# Patient Record
Sex: FEMALE | Race: WHITE | NOT HISPANIC OR LATINO | Employment: OTHER | URBAN - METROPOLITAN AREA
[De-identification: names, ages, dates, MRNs, and addresses within clinical notes are randomized per-mention and may not be internally consistent; named-entity substitution may affect disease eponyms.]

---

## 2021-02-11 ENCOUNTER — RECORDS - HEALTHEAST (OUTPATIENT)
Dept: LAB | Facility: CLINIC | Age: 66
End: 2021-02-11

## 2021-02-11 LAB
SARS-COV-2 PCR COMMENT: NORMAL
SARS-COV-2 RNA SPEC QL NAA+PROBE: NEGATIVE
SARS-COV-2 VIRUS SPECIMEN SOURCE: NORMAL

## 2021-03-11 ENCOUNTER — RECORDS - HEALTHEAST (OUTPATIENT)
Dept: LAB | Facility: CLINIC | Age: 66
End: 2021-03-11

## 2021-03-29 ENCOUNTER — AMBULATORY - HEALTHEAST (OUTPATIENT)
Dept: OTHER | Facility: CLINIC | Age: 66
End: 2021-03-29

## 2021-03-29 ENCOUNTER — DOCUMENTATION ONLY (OUTPATIENT)
Dept: OTHER | Facility: CLINIC | Age: 66
End: 2021-03-29

## 2021-03-31 ENCOUNTER — ASSISTED LIVING VISIT (OUTPATIENT)
Dept: GERIATRICS | Facility: CLINIC | Age: 66
End: 2021-03-31
Payer: MEDICARE

## 2021-03-31 VITALS — BODY MASS INDEX: 23.46 KG/M2 | TEMPERATURE: 97.1 F | HEIGHT: 66 IN | WEIGHT: 146 LBS | OXYGEN SATURATION: 97 %

## 2021-03-31 DIAGNOSIS — G30.0 DEMENTIA OF THE ALZHEIMER'S TYPE WITH EARLY ONSET WITHOUT BEHAVIORAL DISTURBANCE (H): ICD-10-CM

## 2021-03-31 DIAGNOSIS — N81.4 UTERINE PROLAPSE: ICD-10-CM

## 2021-03-31 DIAGNOSIS — F10.11 HISTORY OF ETOH ABUSE: ICD-10-CM

## 2021-03-31 DIAGNOSIS — Z78.9 FULL CODE STATUS: ICD-10-CM

## 2021-03-31 DIAGNOSIS — Z78.9 TAKES DIETARY SUPPLEMENTS: Primary | ICD-10-CM

## 2021-03-31 DIAGNOSIS — F02.80 DEMENTIA OF THE ALZHEIMER'S TYPE WITH EARLY ONSET WITHOUT BEHAVIORAL DISTURBANCE (H): ICD-10-CM

## 2021-03-31 DIAGNOSIS — N32.81 OVERACTIVE BLADDER: ICD-10-CM

## 2021-03-31 RX ORDER — MULTIPLE VITAMINS W/ MINERALS TAB 9MG-400MCG
1 TAB ORAL DAILY
COMMUNITY
End: 2021-05-08

## 2021-03-31 RX ORDER — CHOLECALCIFEROL (VITAMIN D3) 50 MCG
1 TABLET ORAL DAILY
COMMUNITY
End: 2022-01-27

## 2021-03-31 RX ORDER — OXYBUTYNIN CHLORIDE 5 MG/1
5 TABLET ORAL 2 TIMES DAILY
COMMUNITY
End: 2021-11-24

## 2021-03-31 ASSESSMENT — MIFFLIN-ST. JEOR: SCORE: 1216.06
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
## 2021-04-01 RX ORDER — MULTIPLE VITAMINS W/ MINERALS TAB 9MG-400MCG
1 TAB ORAL DAILY
Qty: 30 TABLET | Refills: 11 | Status: SHIPPED | OUTPATIENT
Start: 2021-04-01 | End: 2021-11-24
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
## 2021-04-08 ENCOUNTER — RECORDS - HEALTHEAST (OUTPATIENT)
Dept: LAB | Facility: CLINIC | Age: 66
End: 2021-04-08

## 2021-04-28 ENCOUNTER — ASSISTED LIVING VISIT (OUTPATIENT)
Dept: GERIATRICS | Facility: CLINIC | Age: 66
End: 2021-04-28
Payer: MEDICARE

## 2021-04-28 VITALS
SYSTOLIC BLOOD PRESSURE: 105 MMHG | DIASTOLIC BLOOD PRESSURE: 80 MMHG | RESPIRATION RATE: 18 BRPM | OXYGEN SATURATION: 97 % | HEIGHT: 56 IN | WEIGHT: 151.2 LBS | HEART RATE: 69 BPM | TEMPERATURE: 97.2 F | BODY MASS INDEX: 34.01 KG/M2

## 2021-04-28 DIAGNOSIS — G30.0 DEMENTIA OF THE ALZHEIMER'S TYPE WITH EARLY ONSET WITHOUT BEHAVIORAL DISTURBANCE (H): ICD-10-CM

## 2021-04-28 DIAGNOSIS — F02.80 DEMENTIA OF THE ALZHEIMER'S TYPE WITH EARLY ONSET WITHOUT BEHAVIORAL DISTURBANCE (H): ICD-10-CM

## 2021-04-28 DIAGNOSIS — Z78.9 TAKES DIETARY SUPPLEMENTS: ICD-10-CM

## 2021-04-28 DIAGNOSIS — N32.81 OVERACTIVE BLADDER: Primary | ICD-10-CM

## 2021-04-28 DIAGNOSIS — I67.9 CEREBRAL VASCULAR DISEASE: ICD-10-CM

## 2021-04-28 ASSESSMENT — MIFFLIN-ST. JEOR: SCORE: 1080.9
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
## 2021-04-30 DIAGNOSIS — Z78.9 TAKES DIETARY SUPPLEMENTS: Primary | ICD-10-CM

## 2021-05-01 RX ORDER — FOLIC ACID/MV,IRON,MIN/LUTEIN 0.4-18-25
TABLET ORAL
Qty: 28 TABLET | Status: SHIPPED | OUTPATIENT
Start: 2021-05-01 | End: 2022-05-04
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
## 2021-05-21 ENCOUNTER — RECORDS - HEALTHEAST (OUTPATIENT)
Dept: LAB | Facility: CLINIC | Age: 66
End: 2021-05-21

## 2021-09-07 PROCEDURE — U0005 INFEC AGEN DETEC AMPLI PROBE: HCPCS | Mod: ORL | Performed by: INTERNAL MEDICINE

## 2021-09-08 ENCOUNTER — LAB REQUISITION (OUTPATIENT)
Dept: LAB | Facility: CLINIC | Age: 66
End: 2021-09-08
Payer: MEDICARE

## 2021-09-08 DIAGNOSIS — U07.1 COVID-19: ICD-10-CM

## 2021-09-09 LAB — SARS-COV-2 RNA RESP QL NAA+PROBE: NEGATIVE

## 2021-09-15 PROCEDURE — U0003 INFECTIOUS AGENT DETECTION BY NUCLEIC ACID (DNA OR RNA); SEVERE ACUTE RESPIRATORY SYNDROME CORONAVIRUS 2 (SARS-COV-2) (CORONAVIRUS DISEASE [COVID-19]), AMPLIFIED PROBE TECHNIQUE, MAKING USE OF HIGH THROUGHPUT TECHNOLOGIES AS DESCRIBED BY CMS-2020-01-R: HCPCS | Mod: ORL | Performed by: INTERNAL MEDICINE

## 2021-09-16 ENCOUNTER — LAB REQUISITION (OUTPATIENT)
Dept: LAB | Facility: CLINIC | Age: 66
End: 2021-09-16
Payer: MEDICARE

## 2021-09-16 DIAGNOSIS — U07.1 COVID-19: ICD-10-CM

## 2021-09-17 LAB — SARS-COV-2 RNA RESP QL NAA+PROBE: NOT DETECTED

## 2021-10-19 ENCOUNTER — ASSISTED LIVING VISIT (OUTPATIENT)
Dept: GERIATRICS | Facility: CLINIC | Age: 66
End: 2021-10-19
Payer: MEDICARE

## 2021-10-19 VITALS
HEART RATE: 74 BPM | BODY MASS INDEX: 23.63 KG/M2 | WEIGHT: 147 LBS | SYSTOLIC BLOOD PRESSURE: 94 MMHG | RESPIRATION RATE: 23 BRPM | DIASTOLIC BLOOD PRESSURE: 71 MMHG | OXYGEN SATURATION: 98 % | HEIGHT: 66 IN | TEMPERATURE: 97.8 F

## 2021-10-19 DIAGNOSIS — Z00.00 ANNUAL PHYSICAL EXAM: Primary | ICD-10-CM

## 2021-10-19 DIAGNOSIS — G30.0 DEMENTIA OF THE ALZHEIMER'S TYPE WITH EARLY ONSET WITHOUT BEHAVIORAL DISTURBANCE (H): ICD-10-CM

## 2021-10-19 DIAGNOSIS — N81.4 UTERINE PROLAPSE: ICD-10-CM

## 2021-10-19 DIAGNOSIS — N32.81 OVERACTIVE BLADDER: ICD-10-CM

## 2021-10-19 DIAGNOSIS — F02.80 DEMENTIA OF THE ALZHEIMER'S TYPE WITH EARLY ONSET WITHOUT BEHAVIORAL DISTURBANCE (H): ICD-10-CM

## 2021-10-19 DIAGNOSIS — Z13.6 SCREENING FOR HYPERTENSION: ICD-10-CM

## 2021-10-19 DIAGNOSIS — F10.11 HISTORY OF ETOH ABUSE: ICD-10-CM

## 2021-10-19 PROBLEM — I67.9 CEREBRAL VASCULAR DISEASE: Status: ACTIVE | Noted: 2021-10-19

## 2021-10-19 PROBLEM — Z78.9 TAKES DIETARY SUPPLEMENTS: Status: ACTIVE | Noted: 2021-10-19

## 2021-10-19 PROBLEM — R41.3 MEMORY DEFICITS: Status: ACTIVE | Noted: 2020-09-18

## 2021-10-19 RX ORDER — DONEPEZIL HYDROCHLORIDE 5 MG/1
5 TABLET, FILM COATED ORAL AT BEDTIME
COMMUNITY
End: 2022-04-11

## 2021-10-19 RX ORDER — MEMANTINE HYDROCHLORIDE 5 MG/1
5 TABLET ORAL 2 TIMES DAILY
COMMUNITY
End: 2021-11-15

## 2021-10-19 RX ORDER — TRAZODONE HYDROCHLORIDE 50 MG/1
25 TABLET, FILM COATED ORAL 2 TIMES DAILY PRN
COMMUNITY
End: 2021-10-25

## 2021-10-19 ASSESSMENT — MIFFLIN-ST. JEOR: SCORE: 1220.6

## 2021-10-19 NOTE — PROGRESS NOTES
[facility]  Chief Complaint   Patient presents with     Annual Visit     [place] Medical Record Number:  [id number]  Place of Service where encounter took place:  [facility] ([facility]) [[id number]]    HPI:    Alberta Polanco  is a 66 year old  (1955), who is being seen today for an annual comprehensive visit. HPI information obtained from: facility chart records, facility staff, patient report and [facility] chart review.     Citlali was visited today while in the community area. She is talkative, stating that it is too cold outside to go on the porch but she is aware that it will be nicer outside later so she will go out at that time. She enjoys walking around outside and if not there then around the unit. She gets along well with the other residents on the unit. Her  lives on the first floor here at [facility] so she enjoys seeing him, reports that they went for a drive down to [place] which she enjoyed. She also shares that she went on a community outing yesterday, rode the bus down to the [facility] area. She denies pain or discomfort today and is sleeping well.     Staff report that she has had increasing anxiety as of late. memantine was started and trazadone PRN has been used as well.     ALLERGIES: Patient has no known allergies.  PAST MEDICAL HISTORY:  has a past medical history of Cerebral vascular disease (10/19/2021), Early onset Alzheimer's dementia without behavioral disturbance (H) (10/19/2021), History of ETOH abuse (10/19/2021), Memory deficits (9/18/2020), Overactive bladder (10/19/2021), and Uterine prolapse (10/19/2021).  PAST SURGICAL HISTORY:  has no past surgical history on file.  IMMUNIZATIONS:  Immunization History   Administered Date(s) Administered     COVID-19,PF,Pfizer 04/15/2021, 05/06/2021     Td (Adult), Adsorbed 04/19/2005     Above immunizations pulled from [facility]. MIIC and facility records also reconciled. Outstanding information sent to  " to update [facility].  Future immunizations needed:  yearly influenza per facility protocol    Current Outpatient Medications   Medication Sig Dispense Refill     CERTAVITE/ANTIOXIDANTS tablet TAKE 1 TABLET BY MOUTH ONCE DAILY 28 tablet PRN     donepezil (ARICEPT) 5 MG tablet Take 5 mg by mouth At Bedtime       memantine (NAMENDA) 5 MG tablet Take 5 mg by mouth 2 times daily       oxybutynin (DITROPAN) 5 MG tablet Take 5 mg by mouth 2 times daily       traZODone (DESYREL) 50 MG tablet Take 0.5 tablets (25 mg) by mouth 2 times daily as needed for other (anxiety)       vitamin D3 (CHOLECALCIFEROL) 50 mcg (2000 units) tablet Take 1 tablet by mouth daily       multivitamin w/minerals (THERA-VIT-M) tablet Take 1 tablet by mouth daily 30 tablet 11       Case Management:  I have reviewed the Assisted Living care plan, current immunizations and preventive care/cancer screening. .Patient's desire to return to the community is not assessible due to cognitive impairment. Current Level of Care is appropriate.    Advance Directive Discussion:    I reviewed the current advanced directives as reflected in EPIC, the POLST and the facility chart, and verified the congruency of orders.  I did not due to cognitive impairment review the advance directives with the resident.     Team Discussion:  I communicated with the appropriate disciplines involved with the Plan of Care:   Nursing      Information reviewed:  Medications, vital signs, orders, and nursing notes.    ROS:  Limited secondary to cognitive impairment but today pt reports as noted above.     Vitals:  BP 94/71   Pulse 74   Temp 97.8  F (36.6  C)   Resp 23   Ht 1.664 m (5' 5.5\")   Wt 66.7 kg (147 lb)   SpO2 98%   BMI 24.09 kg/m   Body mass index is 24.09 kg/m .  Exam:  GENERAL APPEARANCE:  Alert, in no distress, pleasant, cooperative  EYES: no discharge or mattering on lids or lashes noted  ENT:  moist mucous membranes, hearing acuity intact  NECK: " supple, symmetrical  RESP: no respiratory distress, Lung sounds clear, patient is on room air  CV:  rate and rhythm regular, no murmur. Edema none in bilateral lower extremities. VASCULAR: warm extremities without open areas.  ABDOMEN: normal bowel sounds, soft, nontender.  M/S:   Gait and station: ambulated without the use of an assistive device, no tenderness or swelling of the joints; able to move all extremities   SKIN:  Inspection and palpation of skin and subcutaneous tissue: skin warm, dry and intact without rashes  NEURO: no facial asymmetry, no speech deficits and able to follow directions, moves all extremities symmetrically, has pill rolling in fingers.   PSYCH:  insight, judgement, and memory impaired affect and mood normal    Lab/Diagnostic data:   Reviewed in [facility]    ASSESSMENT/PLAN  Dementia  Anxiety  Middle stage per recent neuropsych testing. She has had increased anxiety so memantine was recently starting to help her settle, this was also recommended by her neurologist as it may provide some benefit. Could consider citalopram in the future if continues to be anxious after the memantine is increased.   --continue with memantine 5 mg BID  --donepenzil 5 mg at HS  --follow up with neurology for another neuropsych evaluation in 1-2 years.   --will get BMP/CBC/TSH on next lab day due to increase anxiety    Overactive bladder  Uterine prolapse  Does not appear to have urinary incontinence any longer. Uterine prolapse is documented as chronic but was lost to follow up with gynecology.   --continue oxybutynin 5 mg BID  --follow up with gynecology PRN     Hx of ETOH use  Has been sober since January 2020.     Screening for hypertension  Based on JNC-8 goals,  patients age of 66 year old, no presence of diabetes or CKD, and goals of care goal BP is <150/90 mm Hg. Patient is stable and continue without pharmacological invention with routine assessment..    Electronically signed by:  Marysol VENCES  MICHELLE Carroll CNP

## 2021-10-19 NOTE — LETTER
10/19/2021        RE: Alberta Polanco  [facility]  [address] [address] [address]        [place] [facility]  Chief Complaint   Patient presents with     Annual Visit     [place] Medical Record Number:  [id number]  Place of Service where encounter took place:  [facility] ([facility]) [[id number]]    HPI:    Alberta Polanco  is a 66 year old  (1955), who is being seen today for an annual comprehensive visit. HPI information obtained from: {FGS HPI:964571}.  Today's concerns are:  {FGS DX:985140}  {HTN}    ALLERGIES: Patient has no known allergies.  PAST MEDICAL HISTORY:  has no past medical history on file.  PAST SURGICAL HISTORY:  has no past surgical history on file.  IMMUNIZATIONS:  Immunization History   Administered Date(s) Administered     COVID-19,PF,Pfizer 04/15/2021, 05/06/2021     Above immunizations pulled from [facility]. MIIC and facility records also reconciled. Outstanding information sent to  to update [facility] ***.  Future immunizations {fgs future immunization:435854}  ***  Current Outpatient Medications   Medication Sig Dispense Refill     CERTAVITE/ANTIOXIDANTS tablet TAKE 1 TABLET BY MOUTH ONCE DAILY 28 tablet PRN     donepezil (ARICEPT) 5 MG tablet Take 5 mg by mouth At Bedtime       memantine (NAMENDA) 5 MG tablet Take 5 mg by mouth 2 times daily       oxybutynin (DITROPAN) 5 MG tablet Take 5 mg by mouth 2 times daily       traZODone (DESYREL) 50 MG tablet Take 25 mg by mouth 2 times daily as needed for sleep       vitamin D3 (CHOLECALCIFEROL) 50 mcg (2000 units) tablet Take 1 tablet by mouth daily       multivitamin w/minerals (THERA-VIT-M) tablet Take 1 tablet by mouth daily 30 tablet 11     {Providers Please double check the med list (in the plan section >> meds & orders tab) and Discontinue any of the meds flagged by the TC to be discontinued}  ***  Case Management:  I have reviewed the {fgsannualcareplan1:132722} .{fgs  "cancer screenin} Patient's desire to return to the community is {FGS RETURN TO COMMUNITY:389812}. Current Level of Care is appropriate.    Advance Directive Discussion:    I reviewed the current advanced directives as reflected in EPIC, the POLST and the facility chart, and verified the congruency of orders ***. I contacted the first party *** and {ADVANCED DIRECTIVE DISCUSSION:785892} plan of Care.  I {DID/DID NOT:416786} review the advance directives with the resident.     Team Discussion:  I communicated with the appropriate disciplines involved with the Plan of Care:   {NURSING HOME DISCIPLINES:088585}  Patient's goal is {fgsgoal:523036}.  Information reviewed:  Medications, vital signs, orders, and nursing notes.    ROS:  {qwrvfd68:159842}    Vitals:  BP 94/71   Pulse 74   Temp 97.8  F (36.6  C)   Resp 23   Ht 1.664 m (5' 5.5\")   Wt 66.7 kg (147 lb)   SpO2 98%   BMI 24.09 kg/m   Body mass index is 24.09 kg/m .  Exam:  {Nursing home physical exam :866253} {2 in each of 9 for comp exam}    Lab/Diagnostic data:   {fgslab:946103}    ASSESSMENT/PLAN  {FGS DX:302208}  {HTN}    {fgsorders:972233}  ***    Electronically signed by:  Lian Dubose ***  {Providers Please double check the med list (in the plan section >> meds & orders tab) and Discontinue any of the meds flagged by the TC to be discontinued}            Sincerely,        Marysol Carroll, MICHELLE CNP    "

## 2021-10-19 NOTE — LETTER
10/19/2021        RE: Alberta Polanco  [facility]  [address] [address]        [place] [facility]  Chief Complaint   Patient presents with     Annual Visit     [facility] Medical Record Number:  [id number]  Place of Service where encounter took place:  [facility] ([facility]) [[id number]]    HPI:    Alberta Polanco  is a 66 year old  (1955), who is being seen today for an annual comprehensive visit. HPI information obtained from: facility chart records, facility staff, patient report and [facility] chart review.     Citlali was visited today while in the community area. She is talkative, stating that it is too cold outside to go on the porch but she is aware that it will be nicer outside later so she will go out at that time. She enjoys walking around outside and if not there then around the unit. She gets along well with the other residents on the unit. Her  lives on the first floor here at [facility] so she enjoys seeing him, reports that they went for a drive down to [place] which she enjoyed. She also shares that she went on a community outing yesterday, rode the bus down to the [facility] area. She denies pain or discomfort today and is sleeping well.     Staff report that she has had increasing anxiety as of late. memantine was started and trazadone PRN has been used as well.     ALLERGIES: Patient has no known allergies.  PAST MEDICAL HISTORY:  has a past medical history of Cerebral vascular disease (10/19/2021), Early onset Alzheimer's dementia without behavioral disturbance (H) (10/19/2021), History of ETOH abuse (10/19/2021), Memory deficits (9/18/2020), Overactive bladder (10/19/2021), and Uterine prolapse (10/19/2021).  PAST SURGICAL HISTORY:  has no past surgical history on file.  IMMUNIZATIONS:  Immunization History   Administered Date(s) Administered     COVID-19,PF,Pfizer 04/15/2021, 05/06/2021     Td (Adult), Adsorbed 04/19/2005     Above  "immunizations pulled from [facility]. MIIC and facility records also reconciled. Outstanding information sent to  to update [facility].  Future immunizations needed:  yearly influenza per facility protocol    Current Outpatient Medications   Medication Sig Dispense Refill     CERTAVITE/ANTIOXIDANTS tablet TAKE 1 TABLET BY MOUTH ONCE DAILY 28 tablet PRN     donepezil (ARICEPT) 5 MG tablet Take 5 mg by mouth At Bedtime       memantine (NAMENDA) 5 MG tablet Take 5 mg by mouth 2 times daily       oxybutynin (DITROPAN) 5 MG tablet Take 5 mg by mouth 2 times daily       traZODone (DESYREL) 50 MG tablet Take 0.5 tablets (25 mg) by mouth 2 times daily as needed for other (anxiety)       vitamin D3 (CHOLECALCIFEROL) 50 mcg (2000 units) tablet Take 1 tablet by mouth daily       multivitamin w/minerals (THERA-VIT-M) tablet Take 1 tablet by mouth daily 30 tablet 11       Case Management:  I have reviewed the Assisted Living care plan, current immunizations and preventive care/cancer screening. .Patient's desire to return to the community is not assessible due to cognitive impairment. Current Level of Care is appropriate.    Advance Directive Discussion:    I reviewed the current advanced directives as reflected in EPIC, the POLST and the facility chart, and verified the congruency of orders.  I did not due to cognitive impairment review the advance directives with the resident.     Team Discussion:  I communicated with the appropriate disciplines involved with the Plan of Care:   Nursing      Information reviewed:  Medications, vital signs, orders, and nursing notes.    ROS:  Limited secondary to cognitive impairment but today pt reports as noted above.     Vitals:  BP 94/71   Pulse 74   Temp 97.8  F (36.6  C)   Resp 23   Ht 1.664 m (5' 5.5\")   Wt 66.7 kg (147 lb)   SpO2 98%   BMI 24.09 kg/m   Body mass index is 24.09 kg/m .  Exam:  GENERAL APPEARANCE:  Alert, in no distress, pleasant, " cooperative  EYES: no discharge or mattering on lids or lashes noted  ENT:  moist mucous membranes, hearing acuity intact  NECK: supple, symmetrical  RESP: no respiratory distress, Lung sounds clear, patient is on room air  CV:  rate and rhythm regular, no murmur. Edema none in bilateral lower extremities. VASCULAR: warm extremities without open areas.  ABDOMEN: normal bowel sounds, soft, nontender.  M/S:   Gait and station: ambulated without the use of an assistive device, no tenderness or swelling of the joints; able to move all extremities   SKIN:  Inspection and palpation of skin and subcutaneous tissue: skin warm, dry and intact without rashes  NEURO: no facial asymmetry, no speech deficits and able to follow directions, moves all extremities symmetrically, has pill rolling in fingers.   PSYCH:  insight, judgement, and memory impaired affect and mood normal    Lab/Diagnostic data:   Reviewed in [facility]    ASSESSMENT/PLAN  Dementia  Anxiety  Middle stage per recent neuropsych testing. She has had increased anxiety so memantine was recently starting to help her settle, this was also recommended by her neurologist as it may provide some benefit. Could consider citalopram in the future if continues to be anxious after the memantine is increased.   --continue with memantine 5 mg BID  --donepenzil 5 mg at HS  --follow up with neurology for another neuropsych evaluation in 1-2 years.   --will get BMP/CBC/TSH on next lab day due to increase anxiety    Overactive bladder  Uterine prolapse  Does not appear to have urinary incontinence any longer. Uterine prolapse is documented as chronic but was lost to follow up with gynecology.   --continue oxybutynin 5 mg BID  --follow up with gynecology PRN     Hx of ETOH use  Has been sober since January 2020.     Screening for hypertension  Based on JNC-8 goals,  patients age of 66 year old, no presence of diabetes or CKD, and goals of care goal BP is <150/90 mm Hg. Patient  is stable and continue without pharmacological invention with routine assessment..    Electronically signed by:  MICHELLE Carbone CNP               Sincerely,        MICHELLE Carbone CNP

## 2021-10-25 RX ORDER — TRAZODONE HYDROCHLORIDE 50 MG/1
25 TABLET, FILM COATED ORAL 2 TIMES DAILY PRN
COMMUNITY
Start: 2021-10-25 | End: 2021-12-28

## 2021-10-26 ENCOUNTER — LAB REQUISITION (OUTPATIENT)
Dept: LAB | Facility: CLINIC | Age: 66
End: 2021-10-26
Payer: MEDICARE

## 2021-10-26 DIAGNOSIS — F41.9 ANXIETY DISORDER, UNSPECIFIED: ICD-10-CM

## 2021-10-27 ENCOUNTER — TRANSFERRED RECORDS (OUTPATIENT)
Dept: HEALTH INFORMATION MANAGEMENT | Facility: CLINIC | Age: 66
End: 2021-10-27

## 2021-10-27 PROCEDURE — 84443 ASSAY THYROID STIM HORMONE: CPT | Mod: ORL | Performed by: NURSE PRACTITIONER

## 2021-10-27 PROCEDURE — 85027 COMPLETE CBC AUTOMATED: CPT | Mod: ORL | Performed by: NURSE PRACTITIONER

## 2021-10-27 PROCEDURE — 80048 BASIC METABOLIC PNL TOTAL CA: CPT | Mod: ORL | Performed by: NURSE PRACTITIONER

## 2021-10-27 PROCEDURE — 36415 COLL VENOUS BLD VENIPUNCTURE: CPT | Mod: ORL | Performed by: NURSE PRACTITIONER

## 2021-10-27 PROCEDURE — P9603 ONE-WAY ALLOW PRORATED MILES: HCPCS | Mod: ORL | Performed by: NURSE PRACTITIONER

## 2021-10-28 LAB
ANION GAP SERPL CALCULATED.3IONS-SCNC: 7 MMOL/L (ref 5–18)
BUN SERPL-MCNC: 13 MG/DL (ref 8–22)
CALCIUM SERPL-MCNC: 10 MG/DL (ref 8.5–10.5)
CHLORIDE BLD-SCNC: 104 MMOL/L (ref 98–107)
CO2 SERPL-SCNC: 28 MMOL/L (ref 22–31)
CREAT SERPL-MCNC: 0.83 MG/DL (ref 0.6–1.1)
ERYTHROCYTE [DISTWIDTH] IN BLOOD BY AUTOMATED COUNT: 12.8 % (ref 10–15)
GFR SERPL CREATININE-BSD FRML MDRD: 74 ML/MIN/1.73M2
GLUCOSE BLD-MCNC: 90 MG/DL (ref 70–125)
HCT VFR BLD AUTO: 42.5 % (ref 35–47)
HGB BLD-MCNC: 14 G/DL (ref 11.7–15.7)
MCH RBC QN AUTO: 31.7 PG (ref 26.5–33)
MCHC RBC AUTO-ENTMCNC: 32.9 G/DL (ref 31.5–36.5)
MCV RBC AUTO: 96 FL (ref 78–100)
PLATELET # BLD AUTO: 305 10E3/UL (ref 150–450)
POTASSIUM BLD-SCNC: 4.6 MMOL/L (ref 3.5–5)
RBC # BLD AUTO: 4.41 10E6/UL (ref 3.8–5.2)
SODIUM SERPL-SCNC: 139 MMOL/L (ref 136–145)
TSH SERPL DL<=0.005 MIU/L-ACNC: 3.04 UIU/ML (ref 0.3–5)
WBC # BLD AUTO: 5.5 10E3/UL (ref 4–11)

## 2021-11-13 DIAGNOSIS — G30.0 EARLY ONSET ALZHEIMER'S DEMENTIA WITHOUT BEHAVIORAL DISTURBANCE (H): Primary | ICD-10-CM

## 2021-11-13 DIAGNOSIS — F02.80 EARLY ONSET ALZHEIMER'S DEMENTIA WITHOUT BEHAVIORAL DISTURBANCE (H): Primary | ICD-10-CM

## 2021-11-15 RX ORDER — MEMANTINE HYDROCHLORIDE 5 MG/1
TABLET ORAL
Qty: 56 TABLET | Status: SHIPPED | OUTPATIENT
Start: 2021-11-15 | End: 2021-11-24 | Stop reason: DRUGHIGH

## 2021-11-23 VITALS
DIASTOLIC BLOOD PRESSURE: 71 MMHG | WEIGHT: 147 LBS | SYSTOLIC BLOOD PRESSURE: 94 MMHG | TEMPERATURE: 97.8 F | HEART RATE: 74 BPM | BODY MASS INDEX: 24.09 KG/M2 | RESPIRATION RATE: 23 BRPM

## 2021-11-24 ENCOUNTER — ASSISTED LIVING VISIT (OUTPATIENT)
Dept: GERIATRICS | Facility: CLINIC | Age: 66
End: 2021-11-24
Payer: MEDICARE

## 2021-11-24 DIAGNOSIS — N81.4 UTERINE PROLAPSE: ICD-10-CM

## 2021-11-24 DIAGNOSIS — F41.9 ANXIETY: ICD-10-CM

## 2021-11-24 DIAGNOSIS — G30.0 DEMENTIA OF THE ALZHEIMER'S TYPE WITH EARLY ONSET WITHOUT BEHAVIORAL DISTURBANCE (H): Primary | ICD-10-CM

## 2021-11-24 DIAGNOSIS — F02.80 DEMENTIA OF THE ALZHEIMER'S TYPE WITH EARLY ONSET WITHOUT BEHAVIORAL DISTURBANCE (H): Primary | ICD-10-CM

## 2021-11-24 DIAGNOSIS — N32.81 OVERACTIVE BLADDER: ICD-10-CM

## 2021-11-24 DIAGNOSIS — I67.9 CEREBRAL VASCULAR DISEASE: ICD-10-CM

## 2021-11-24 PROCEDURE — 99207 PR CDG-MDM COMPONENT: MEETS MODERATE - DOWN CODED: CPT | Performed by: NURSE PRACTITIONER

## 2021-11-24 RX ORDER — MEMANTINE HYDROCHLORIDE 10 MG/1
10 TABLET ORAL 2 TIMES DAILY
COMMUNITY
End: 2022-10-13

## 2021-11-24 RX ORDER — OXYBUTYNIN CHLORIDE 5 MG/1
5 TABLET, EXTENDED RELEASE ORAL 2 TIMES DAILY
COMMUNITY
End: 2022-04-11

## 2021-11-24 NOTE — LETTER
11/24/2021        RE: Alberta Polanco  [facility]  [address] [facility] [address]        [place] [facility]  [place] Medical Record Number:  [id number]  Place of Service where encounter took place:  [facility] ([facility]) [[id number]]  Chief Complaint   Patient presents with     RECHECK       HPI:    Alberta Polanco  is a 66 year old (1955), who is being seen today for an episodic care visit.  HPI information obtained from: facility chart records, facility staff, patient report and [facility] chart review.   She has lived in Memory Care at this facility since 2/2021. Medical history significant for early onset Alzheimer's dementia, cerebral vascular disease, anxiety, urinary incontinence, etoh abuse.   [place]  [place] in [place]. They have 2 daughters, one lives in [place].     Today's concern is:     Dementia of the Alzheimer's type with early onset without behavioral disturbance (H)  Cerebral vascular disease  Anxiety  Overactive bladder  Uterine prolapse   She is a poor historian. Repetitive and tangential. Focused on the weather and the closed patio. Denies feeling ill. Denies pain. Good appetite. Ambulates without a device. Requires supervision to min assist with cares. Nurses report she is less anxious and no behavior issues.       Past Medical and Surgical History reviewed in Epic today.    MEDICATIONS:    Current Outpatient Medications   Medication Sig Dispense Refill     aspirin (ASA) 81 MG EC tablet Take 1 tablet (81 mg) by mouth daily 30 tablet 11     CERTAVITE/ANTIOXIDANTS tablet TAKE 1 TABLET BY MOUTH ONCE DAILY 28 tablet PRN     donepezil (ARICEPT) 5 MG tablet Take 5 mg by mouth At Bedtime       memantine (NAMENDA) 10 MG tablet Take 10 mg by mouth 2 times daily       oxybutynin ER (DITROPAN-XL) 5 MG 24 hr tablet Take 5 mg by mouth 2 times daily       traZODone (DESYREL) 50 MG tablet Take 0.5 tablets (25 mg) by mouth 2 times daily as needed for other (anxiety)        vitamin D3 (CHOLECALCIFEROL) 50 mcg (2000 units) tablet Take 1 tablet by mouth daily           REVIEW OF SYSTEMS:  Unobtainable secondary to cognitive impairment.     Objective:  BP 94/71   Pulse 74   Temp 97.8  F (36.6  C)   Resp 23   Wt 66.7 kg (147 lb)   BMI 24.09 kg/m    Exam:  GENERAL APPEARANCE:  Alert, in no distress  ENT:  normal hearing acuity  EYES:  EOM normal, conjunctiva and lids normal  NECK: no adenopathy,masses or thyromegaly  RESP:  lungs clear to auscultation , no respiratory distress  CV:  regular rate and rhythm, no murmur, no edema  ABDOMEN:  soft, non-tender, no distension, no masses  M/S:   gait steady without a device. MCCONNELL with good strength. No joint inflammation  SKIN:  no visible rashes or open areas  PSYCH:  oriented to self, insight and judgement impaired, memory impaired , affect and mood normal    Labs:   Recent labs in [place] reviewed by me today.     ASSESSMENT / PLAN:  (G30.0,  F02.80) Dementia of the Alzheimer's type with early onset without behavioral disturbance (H)  (primary encounter diagnosis)  Comment: 5-6 yr history of declining cognition and erratic behavior (driving without license, inability to maintain a job, inability to manage finances, etoh abuse).  She had Neuropsych testing 3/26/2021 that showed moderate to severe neurocognitive deficits in all domains. Dementia felt to possibly be due to frontotemporal lobar degeneration or the frontal variant of Alzheimer's disease. Repeat testing in 1-2 yrs was recommended.   She appears to be content and doing well in Memory Care  Plan: continue donepezil, memantine, prn trazodone. Neurology follow up as scheduled. She has a legal guardian, Brigitte Barker.     (I67.9) Cerebral vascular disease  Comment: small old lacunar infarcts noted on MRI brain 9/2020  Plan: start ASA  81 MG EC tablet         (F41.9) Anxiety  Comment: improved per staff report   Plan: continue prn trazodone, donepezil and memantine    (N32.81) Overactive  bladder  (N81.4) Uterine prolapse  Comment: chronic, no s/s of infection   Plan: continue oxybutynin       Electronically signed by:  MICHELLE Marin CNP                 Sincerely,        MICHELLE Marin CNP

## 2021-11-24 NOTE — PROGRESS NOTES
[facility]  [place] Medical Record Number:  [id number]  Place of Service where encounter took place:  [facility] ([facility]) [[id number]]  Chief Complaint   Patient presents with     RECHECK       HPI:    Alberta Polanco  is a 66 year old (1955), who is being seen today for an episodic care visit.  HPI information obtained from: facility chart records, facility staff, patient report and [facility] chart review.   She has lived in Memory Care at this facility since 2/2021. Medical history significant for early onset Alzheimer's dementia, cerebral vascular disease, anxiety, urinary incontinence, etoh abuse.   [place] in [place]. They have 2 daughters, one lives in [place].     Today's concern is:     Dementia of the Alzheimer's type with early onset without behavioral disturbance (H)  Cerebral vascular disease  Anxiety  Overactive bladder  Uterine prolapse   She is a poor historian. Repetitive and tangential. Focused on the weather and the closed patio. Denies feeling ill. Denies pain. Good appetite. Ambulates without a device. Requires supervision to min assist with cares. Nurses report she is less anxious and no behavior issues.       Past Medical and Surgical History reviewed in Epic today.    MEDICATIONS:    Current Outpatient Medications   Medication Sig Dispense Refill     aspirin (ASA) 81 MG EC tablet Take 1 tablet (81 mg) by mouth daily 30 tablet 11     CERTAVITE/ANTIOXIDANTS tablet TAKE 1 TABLET BY MOUTH ONCE DAILY 28 tablet PRN     donepezil (ARICEPT) 5 MG tablet Take 5 mg by mouth At Bedtime       memantine (NAMENDA) 10 MG tablet Take 10 mg by mouth 2 times daily       oxybutynin ER (DITROPAN-XL) 5 MG 24 hr tablet Take 5 mg by mouth 2 times daily       traZODone (DESYREL) 50 MG tablet Take 0.5 tablets (25 mg) by mouth 2 times daily as needed for other (anxiety)       vitamin D3 (CHOLECALCIFEROL) 50 mcg (2000 units) tablet Take 1 tablet by mouth daily           REVIEW OF  SYSTEMS:  Unobtainable secondary to cognitive impairment.     Objective:  BP 94/71   Pulse 74   Temp 97.8  F (36.6  C)   Resp 23   Wt 66.7 kg (147 lb)   BMI 24.09 kg/m    Exam:  GENERAL APPEARANCE:  Alert, in no distress  ENT:  normal hearing acuity  EYES:  EOM normal, conjunctiva and lids normal  NECK: no adenopathy,masses or thyromegaly  RESP:  lungs clear to auscultation , no respiratory distress  CV:  regular rate and rhythm, no murmur, no edema  ABDOMEN:  soft, non-tender, no distension, no masses  M/S:   gait steady without a device. MCCONNELL with good strength. No joint inflammation  SKIN:  no visible rashes or open areas  PSYCH:  oriented to self, insight and judgement impaired, memory impaired , affect and mood normal    Labs:   Recent labs in [place] [facility] reviewed by me today.     ASSESSMENT / PLAN:  (G30.0,  F02.80) Dementia of the Alzheimer's type with early onset without behavioral disturbance (H)  (primary encounter diagnosis)  Comment: 5-6 yr history of declining cognition and erratic behavior (driving without license, inability to maintain a job, inability to manage finances, etoh abuse).  She had Neuropsych testing 3/26/2021 that showed moderate to severe neurocognitive deficits in all domains. Dementia felt to possibly be due to frontotemporal lobar degeneration or the frontal variant of Alzheimer's disease. Repeat testing in 1-2 yrs was recommended.   She appears to be content and doing well in Memory Care  Plan: continue donepezil, memantine, prn trazodone. Neurology follow up as scheduled. She has a legal guardian, Brigitte Barker.     (I67.9) Cerebral vascular disease  Comment: small old lacunar infarcts noted on MRI brain 9/2020  Plan: start ASA  81 MG EC tablet         (F41.9) Anxiety  Comment: improved per staff report   Plan: continue prn trazodone, donepezil and memantine    (N32.81) Overactive bladder  (N81.4) Uterine prolapse  Comment: chronic, no s/s of infection   Plan: continue oxybutynin        Electronically signed by:  MICHELLE Marin CNP

## 2021-12-28 DIAGNOSIS — F41.9 ANXIETY: Primary | ICD-10-CM

## 2021-12-28 RX ORDER — TRAZODONE HYDROCHLORIDE 50 MG/1
25 TABLET, FILM COATED ORAL 2 TIMES DAILY PRN
Qty: 30 TABLET | Refills: 3 | Status: SHIPPED | OUTPATIENT
Start: 2021-12-28

## 2022-01-09 PROCEDURE — U0005 INFEC AGEN DETEC AMPLI PROBE: HCPCS | Mod: ORL | Performed by: INTERNAL MEDICINE

## 2022-01-10 ENCOUNTER — LAB REQUISITION (OUTPATIENT)
Dept: LAB | Facility: CLINIC | Age: 67
End: 2022-01-10
Payer: MEDICARE

## 2022-01-10 DIAGNOSIS — U07.1 COVID-19: ICD-10-CM

## 2022-01-11 LAB — SARS-COV-2 RNA RESP QL NAA+PROBE: POSITIVE

## 2022-01-12 ENCOUNTER — LAB REQUISITION (OUTPATIENT)
Dept: LAB | Facility: CLINIC | Age: 67
End: 2022-01-12
Payer: MEDICARE

## 2022-01-12 ENCOUNTER — ASSISTED LIVING VISIT (OUTPATIENT)
Dept: GERIATRICS | Facility: CLINIC | Age: 67
End: 2022-01-12
Payer: MEDICARE

## 2022-01-12 VITALS
BODY MASS INDEX: 25.89 KG/M2 | SYSTOLIC BLOOD PRESSURE: 118 MMHG | WEIGHT: 158 LBS | HEART RATE: 72 BPM | TEMPERATURE: 97.6 F | RESPIRATION RATE: 21 BRPM | DIASTOLIC BLOOD PRESSURE: 79 MMHG

## 2022-01-12 DIAGNOSIS — G30.0 DEMENTIA OF THE ALZHEIMER'S TYPE WITH EARLY ONSET WITHOUT BEHAVIORAL DISTURBANCE (H): ICD-10-CM

## 2022-01-12 DIAGNOSIS — U07.1 LAB TEST POSITIVE FOR DETECTION OF COVID-19 VIRUS: Primary | ICD-10-CM

## 2022-01-12 DIAGNOSIS — U07.1 COVID-19: ICD-10-CM

## 2022-01-12 DIAGNOSIS — F41.9 ANXIETY: ICD-10-CM

## 2022-01-12 DIAGNOSIS — F02.80 DEMENTIA OF THE ALZHEIMER'S TYPE WITH EARLY ONSET WITHOUT BEHAVIORAL DISTURBANCE (H): ICD-10-CM

## 2022-01-12 DIAGNOSIS — I67.9 CEREBRAL VASCULAR DISEASE: ICD-10-CM

## 2022-01-12 NOTE — PROGRESS NOTES
"[place] [facility]  [place] Medical Record Number:  [id number]  Place of Service where encounter took place:  [facility] ([facility]) [[id number]]  Chief Complaint   Patient presents with     RECHECK       HPI:    Alberta Polanco  is a 66 year old (1955), who is being seen today for an episodic care visit.  HPI information obtained from: facility chart records, facility staff, patient report and [facility] chart review.   She has lived in Memory Care at this facility since 2/2021. Medical history significant for early onset Alzheimer's dementia, cerebral vascular disease, anxiety, urinary incontinence, etoh abuse.   [place]  [place] in [place]. They have 2 daughters, one lives in [place]. She has a legal guardian through [employer].       Today's concern is:     Lab test positive for detection of COVID-19 virus  Dementia of the Alzheimer's type with early onset without behavioral disturbance (H)  Cerebral vascular disease  Anxiety   She tested positive for COVID on surveillance testing 1/9/2021. Afebrile and asymptomatic. The challenge has been keeping her in her room as she usually ambulates about the unit. She is a poor historian. Denies feeling ill. \"I'm not sick. I'm never sick.\" Conversation is repetitive. Requires supervision to min assist with cares.     Past Medical and Surgical History reviewed in Epic today.    MEDICATIONS:    Current Outpatient Medications   Medication Sig Dispense Refill     aspirin (ASA) 81 MG EC tablet Take 1 tablet (81 mg) by mouth daily 30 tablet 11     CERTAVITE/ANTIOXIDANTS tablet TAKE 1 TABLET BY MOUTH ONCE DAILY 28 tablet PRN     donepezil (ARICEPT) 5 MG tablet Take 5 mg by mouth At Bedtime       memantine (NAMENDA) 10 MG tablet Take 10 mg by mouth 2 times daily       oxybutynin ER (DITROPAN-XL) 5 MG 24 hr tablet Take 5 mg by mouth 2 times daily       traZODone (DESYREL) 50 MG tablet Take 0.5 tablets (25 mg) by mouth 2 times daily as needed for other " (anxiety) 30 tablet 3     vitamin D3 (CHOLECALCIFEROL) 50 mcg (2000 units) tablet Take 1 tablet by mouth daily           REVIEW OF SYSTEMS:  Unobtainable secondary to cognitive impairment.     Objective:  /79   Pulse 72   Temp 97.6  F (36.4  C)   Resp 21   Wt 71.7 kg (158 lb)   BMI 25.89 kg/m    Limited exam due to COVID precautions   GENERAL APPEARANCE:  Alert, in no distress  ENT:  normal hearing acuity  EYES:  EOM normal, conjunctiva and lids normal  RESP:  no respiratory distress  CV:  no edema  M/S:   gait steady. MCCONNELL with good strength  PSYCH:  oriented to self, insight and judgement impaired, memory impaired , anxious    Labs:   Recent labs in [place] reviewed by me today.     ASSESSMENT / PLAN:  (U07.1) Lab test positive for detection of COVID-19 virus  (primary encounter diagnosis)  Comment: appears asymptomatic   Plan: closely monitor VS, O2 sats, respiratory status.   Family and guardian have been notified per nurse.     (G30.0,  F02.80) Dementia of the Alzheimer's type with early onset without behavioral disturbance (H)  (I67.9) Cerebral vascular disease  (F41.9) Anxiety  Comment: somewhat anxious today re: quarantine, but no behavior issues.    She had a 5-6 yr history of declining cognition and erratic behavior (driving without license, inability to maintain a job, inability to manage finances, etoh abuse).  Neuropsych testing 3/26/2021 showed moderate to severe neurocognitive deficits in all domains. Dementia felt to possibly be due to frontotemporal lobar degeneration or the frontal variant of Alzheimer's disease. Repeat testing in 1-2 yrs was recommended.   Plan:  continue donepezil, memantine, prn trazodone. Neurology follow up as scheduled. Memory Care staff assistance with cares, meals, activities and med admin.       Electronically signed by:  MICHELLE Marin CNP

## 2022-01-12 NOTE — LETTER
"    1/12/2022        RE: Alberta Polanco  [facility]  [address] [facility] [address]        [place] [facility]  [place] [facility] Record Number:  [id number]  Place of Service where encounter took place:  [facility] ([facility]) [[id number]]  Chief Complaint   Patient presents with     RECHECK       HPI:    Alberta Polanco  is a 66 year old (1955), who is being seen today for an episodic care visit.  HPI information obtained from: facility chart records, facility staff, patient report and [facility] chart review.   She has lived in Memory Care at this facility since 2/2021. Medical history significant for early onset Alzheimer's dementia, cerebral vascular disease, anxiety, urinary incontinence, etoh abuse.   [place] in [place]. They have 2 daughters, one lives in [place]. She has a legal guardian through [employer].       Today's concern is:     Lab test positive for detection of COVID-19 virus  Dementia of the Alzheimer's type with early onset without behavioral disturbance (H)  Cerebral vascular disease  Anxiety   She tested positive for COVID on surveillance testing 1/9/2021. Afebrile and asymptomatic. The challenge has been keeping her in her room as she usually ambulates about the unit. She is a poor historian. Denies feeling ill. \"I'm not sick. I'm never sick.\" Conversation is repetitive. Requires supervision to min assist with cares.     Past Medical and Surgical History reviewed in Epic today.    MEDICATIONS:    Current Outpatient Medications   Medication Sig Dispense Refill     aspirin (ASA) 81 MG EC tablet Take 1 tablet (81 mg) by mouth daily 30 tablet 11     CERTAVITE/ANTIOXIDANTS tablet TAKE 1 TABLET BY MOUTH ONCE DAILY 28 tablet PRN     donepezil (ARICEPT) 5 MG tablet Take 5 mg by mouth At Bedtime       memantine (NAMENDA) 10 MG tablet Take 10 mg by mouth 2 times daily       oxybutynin ER (DITROPAN-XL) 5 MG 24 hr tablet Take 5 mg by mouth 2 times " daily       traZODone (DESYREL) 50 MG tablet Take 0.5 tablets (25 mg) by mouth 2 times daily as needed for other (anxiety) 30 tablet 3     vitamin D3 (CHOLECALCIFEROL) 50 mcg (2000 units) tablet Take 1 tablet by mouth daily           REVIEW OF SYSTEMS:  Unobtainable secondary to cognitive impairment.     Objective:  /79   Pulse 72   Temp 97.6  F (36.4  C)   Resp 21   Wt 71.7 kg (158 lb)   BMI 25.89 kg/m    Limited exam due to COVID precautions   GENERAL APPEARANCE:  Alert, in no distress  ENT:  normal hearing acuity  EYES:  EOM normal, conjunctiva and lids normal  RESP:  no respiratory distress  CV:  no edema  M/S:   gait steady. MCCONNELL with good strength  PSYCH:  oriented to self, insight and judgement impaired, memory impaired , anxious    Labs:   Recent labs in [place] reviewed by me today.     ASSESSMENT / PLAN:  (U07.1) Lab test positive for detection of COVID-19 virus  (primary encounter diagnosis)  Comment: appears asymptomatic   Plan: closely monitor VS, O2 sats, respiratory status.   Family and guardian have been notified per nurse.     (G30.0,  F02.80) Dementia of the Alzheimer's type with early onset without behavioral disturbance (H)  (I67.9) Cerebral vascular disease  (F41.9) Anxiety  Comment: somewhat anxious today re: quarantine, but no behavior issues.    She had a 5-6 yr history of declining cognition and erratic behavior (driving without license, inability to maintain a job, inability to manage finances, etoh abuse).  Neuropsych testing 3/26/2021 showed moderate to severe neurocognitive deficits in all domains. Dementia felt to possibly be due to frontotemporal lobar degeneration or the frontal variant of Alzheimer's disease. Repeat testing in 1-2 yrs was recommended.   Plan:  continue donepezil, memantine, prn trazodone. Neurology follow up as scheduled. Memory Care staff assistance with cares, meals, activities and med admin.       Electronically signed by:  MICHELLE Marin CNP                  Sincerely,        MICHELLE Marin CNP

## 2022-01-18 ENCOUNTER — LAB REQUISITION (OUTPATIENT)
Dept: LAB | Facility: CLINIC | Age: 67
End: 2022-01-18
Payer: MEDICARE

## 2022-01-18 DIAGNOSIS — U07.1 COVID-19: ICD-10-CM

## 2022-01-27 DIAGNOSIS — Z78.9 TAKES DIETARY SUPPLEMENTS: Primary | ICD-10-CM

## 2022-01-27 RX ORDER — CHOLECALCIFEROL (VITAMIN D3) 50 MCG
1 TABLET ORAL DAILY
Qty: 90 TABLET | Refills: 3 | Status: SHIPPED | OUTPATIENT
Start: 2022-01-27 | End: 2022-12-13

## 2022-02-14 ENCOUNTER — DOCUMENTATION ONLY (OUTPATIENT)
Dept: OTHER | Facility: CLINIC | Age: 67
End: 2022-02-14
Payer: MEDICARE

## 2022-04-11 DIAGNOSIS — N32.81 OVERACTIVE BLADDER: Primary | ICD-10-CM

## 2022-04-11 DIAGNOSIS — F02.80 EARLY ONSET ALZHEIMER'S DEMENTIA WITHOUT BEHAVIORAL DISTURBANCE (H): ICD-10-CM

## 2022-04-11 DIAGNOSIS — G30.0 EARLY ONSET ALZHEIMER'S DEMENTIA WITHOUT BEHAVIORAL DISTURBANCE (H): ICD-10-CM

## 2022-04-11 RX ORDER — OXYBUTYNIN CHLORIDE 5 MG/1
5 TABLET, EXTENDED RELEASE ORAL 2 TIMES DAILY
Qty: 180 TABLET | Refills: 3 | Status: SHIPPED | OUTPATIENT
Start: 2022-04-11 | End: 2022-11-09 | Stop reason: DRUGHIGH

## 2022-04-11 RX ORDER — DONEPEZIL HYDROCHLORIDE 5 MG/1
5 TABLET, FILM COATED ORAL AT BEDTIME
Qty: 90 TABLET | Refills: 3 | Status: SHIPPED | OUTPATIENT
Start: 2022-04-11 | End: 2023-01-01 | Stop reason: DRUGHIGH

## 2022-04-15 ENCOUNTER — LAB REQUISITION (OUTPATIENT)
Dept: LAB | Facility: CLINIC | Age: 67
End: 2022-04-15
Payer: MEDICARE

## 2022-04-15 DIAGNOSIS — U07.1 COVID-19: ICD-10-CM

## 2022-04-15 LAB — SARS-COV-2 RNA RESP QL NAA+PROBE: NEGATIVE

## 2022-04-15 PROCEDURE — U0005 INFEC AGEN DETEC AMPLI PROBE: HCPCS | Mod: ORL | Performed by: INTERNAL MEDICINE

## 2022-04-20 ENCOUNTER — LAB REQUISITION (OUTPATIENT)
Dept: LAB | Facility: CLINIC | Age: 67
End: 2022-04-20
Payer: MEDICARE

## 2022-04-20 DIAGNOSIS — U07.1 COVID-19: ICD-10-CM

## 2022-04-20 PROCEDURE — U0003 INFECTIOUS AGENT DETECTION BY NUCLEIC ACID (DNA OR RNA); SEVERE ACUTE RESPIRATORY SYNDROME CORONAVIRUS 2 (SARS-COV-2) (CORONAVIRUS DISEASE [COVID-19]), AMPLIFIED PROBE TECHNIQUE, MAKING USE OF HIGH THROUGHPUT TECHNOLOGIES AS DESCRIBED BY CMS-2020-01-R: HCPCS | Mod: ORL | Performed by: INTERNAL MEDICINE

## 2022-04-21 LAB — SARS-COV-2 RNA RESP QL NAA+PROBE: NEGATIVE

## 2022-04-22 ENCOUNTER — ASSISTED LIVING VISIT (OUTPATIENT)
Dept: GERIATRICS | Facility: CLINIC | Age: 67
End: 2022-04-22
Payer: MEDICARE

## 2022-04-22 VITALS — OXYGEN SATURATION: 97 % | TEMPERATURE: 98 F | BODY MASS INDEX: 25.55 KG/M2 | WEIGHT: 159 LBS | HEIGHT: 66 IN

## 2022-04-22 DIAGNOSIS — G30.0 EARLY ONSET ALZHEIMER'S DEMENTIA WITHOUT BEHAVIORAL DISTURBANCE (H): Primary | ICD-10-CM

## 2022-04-22 DIAGNOSIS — F02.80 EARLY ONSET ALZHEIMER'S DEMENTIA WITHOUT BEHAVIORAL DISTURBANCE (H): Primary | ICD-10-CM

## 2022-04-22 DIAGNOSIS — F41.9 ANXIETY: ICD-10-CM

## 2022-04-22 DIAGNOSIS — Z13.6 SCREENING FOR HYPERTENSION: ICD-10-CM

## 2022-04-22 NOTE — LETTER
"    4/22/2022        RE: Alberta Polanco  [facility]  [address] [address] [address]        [facility]    Chief Complaint   Patient presents with     RECHECK     HPI:  Alberta Polanco is a 67 year old  (1955), who is being seen today for an episodic care visit at: [facility] ([facility]) [[id number]]. Today's concern is: Patient wandering unit today and picking up magazines. Pleasant with writer and offered quick smile. Denies CP, SOB or lightheadedness. Staff without complaints.     BP Readings from Last 3 Encounters:   01/12/22 118/79   11/23/21 94/71   10/19/21 94/71     Pulse Readings from Last 4 Encounters:   01/12/22 72   11/23/21 74   10/19/21 74   04/28/21 69     Wt Readings from Last 4 Encounters:   04/22/22 72.1 kg (159 lb)   01/12/22 71.7 kg (158 lb)   11/23/21 66.7 kg (147 lb)   10/19/21 66.7 kg (147 lb)         Allergies, and PMH/PSH reviewed in [facility] today.  REVIEW OF SYSTEMS:  Unobtainable secondary to cognitive impairment.     Objective:   Temp 98  F (36.7  C)   Ht 1.664 m (5' 5.5\")   Wt 72.1 kg (159 lb)   SpO2 97%   BMI 26.06 kg/m    GENERAL APPEARANCE:  Alert  ENT:  Mouth and posterior oropharynx normal, moist mucous membranes, [place]  RESP:  respiratory effort and palpation of chest normal, lungs clear to auscultation   CV:  Palpation and auscultation of heart done , regular rate and rhythm, no murmur, rub, or gallop  M/S:   Gait and station normal  Digits and nails normal  SKIN:  Inspection of skin and subcutaneous tissue baseline, Palpation of skin and subcutaneous tissue baseline  NEURO:   Cranial nerves 2-12 are normal tested and grossly at patient's baseline  PSYCH:  insight and judgement impaired, memory impaired , affect and mood normal    Labs done in SNF are in [facility]. Please refer to them using EPIC/Care Everywhere. and Recent labs in [facility] reviewed by me today.     Assessment/Plan:  (G30.0,  F02.80) Early onset Alzheimer's dementia " without behavioral disturbance (H)  (primary encounter diagnosis)  (F41.9) Anxiety  [donepezil 5 mg/day and memantine 10 mg/BID]  Plan:   Resides on secure dementia unit with assistance for ADLs and meals.    (Z13.6) Screening for hypertension  BP Readings from Last 3 Encounters:   01/12/22 118/79   11/23/21 94/71   10/19/21 94/71   Plan: No concerns at this time, monitor.         Orders:  CBC  BMP  TSH  A1c    Electronically signed by:   Torri Khalil NP              Sincerely,        Torri Khalil NP

## 2022-04-22 NOTE — PROGRESS NOTES
"[facility]    Chief Complaint   Patient presents with     RECHECK     HPI:  Alberta Polanco is a 67 year old  (1955), who is being seen today for an episodic care visit at: [facility]) [[id number]]. Today's concern is: Patient wandering unit today and picking up magazines. Pleasant with writer and offered quick smile. Denies CP, SOB or lightheadedness. Staff without complaints.     BP Readings from Last 3 Encounters:   01/12/22 118/79   11/23/21 94/71   10/19/21 94/71     Pulse Readings from Last 4 Encounters:   01/12/22 72   11/23/21 74   10/19/21 74   04/28/21 69     Wt Readings from Last 4 Encounters:   04/22/22 72.1 kg (159 lb)   01/12/22 71.7 kg (158 lb)   11/23/21 66.7 kg (147 lb)   10/19/21 66.7 kg (147 lb)         Allergies, and PMH/PSH reviewed in EPIC today.  REVIEW OF SYSTEMS:  Unobtainable secondary to cognitive impairment.     Objective:   Temp 98  F (36.7  C)   Ht 1.664 m (5' 5.5\")   Wt 72.1 kg (159 lb)   SpO2 97%   BMI 26.06 kg/m    GENERAL APPEARANCE:  Alert  ENT:  Mouth and posterior oropharynx normal, moist mucous membranes, [place]  RESP:  respiratory effort and palpation of chest normal, lungs clear to auscultation   CV:  Palpation and auscultation of heart done , regular rate and rhythm, no murmur, rub, or gallop  M/S:   Gait and station normal  Digits and nails normal  SKIN:  Inspection of skin and subcutaneous tissue baseline, Palpation of skin and subcutaneous tissue baseline  NEURO:   Cranial nerves 2-12 are normal tested and grossly at patient's baseline  PSYCH:  insight and judgement impaired, memory impaired , affect and mood normal    Labs done in SNF are in [facility]. Please refer to them using EPIC/Care Everywhere. and Recent labs in [facility] reviewed by me today.     Assessment/Plan:  (G30.0,  F02.80) Early onset Alzheimer's dementia without behavioral disturbance (H)  (primary encounter diagnosis)  (F41.9) Anxiety  [donepezil 5 mg/day and " memantine 10 mg/BID]  Plan:   Resides on secure dementia unit with assistance for ADLs and meals.    (Z13.6) Screening for hypertension  BP Readings from Last 3 Encounters:   01/12/22 118/79   11/23/21 94/71   10/19/21 94/71   Plan: No concerns at this time, monitor.         Orders:  CBC  BMP  TSH  A1c    Electronically signed by:   Torri Khalil NP

## 2022-04-27 ENCOUNTER — LAB REQUISITION (OUTPATIENT)
Dept: LAB | Facility: CLINIC | Age: 67
End: 2022-04-27
Payer: MEDICARE

## 2022-04-27 DIAGNOSIS — U07.1 COVID-19: ICD-10-CM

## 2022-04-27 PROCEDURE — U0003 INFECTIOUS AGENT DETECTION BY NUCLEIC ACID (DNA OR RNA); SEVERE ACUTE RESPIRATORY SYNDROME CORONAVIRUS 2 (SARS-COV-2) (CORONAVIRUS DISEASE [COVID-19]), AMPLIFIED PROBE TECHNIQUE, MAKING USE OF HIGH THROUGHPUT TECHNOLOGIES AS DESCRIBED BY CMS-2020-01-R: HCPCS | Mod: ORL | Performed by: INTERNAL MEDICINE

## 2022-04-28 LAB — SARS-COV-2 RNA RESP QL NAA+PROBE: NEGATIVE

## 2022-05-03 ENCOUNTER — LAB REQUISITION (OUTPATIENT)
Dept: LAB | Facility: CLINIC | Age: 67
End: 2022-05-03
Payer: MEDICARE

## 2022-05-03 DIAGNOSIS — R73.09 OTHER ABNORMAL GLUCOSE: ICD-10-CM

## 2022-05-03 DIAGNOSIS — R79.89 OTHER SPECIFIED ABNORMAL FINDINGS OF BLOOD CHEMISTRY: ICD-10-CM

## 2022-05-03 DIAGNOSIS — U07.1 COVID-19: ICD-10-CM

## 2022-05-03 DIAGNOSIS — R94.6 ABNORMAL RESULTS OF THYROID FUNCTION STUDIES: ICD-10-CM

## 2022-05-03 DIAGNOSIS — R79.9 ABNORMAL FINDING OF BLOOD CHEMISTRY, UNSPECIFIED: ICD-10-CM

## 2022-05-04 DIAGNOSIS — N32.81 OVERACTIVE BLADDER: Primary | ICD-10-CM

## 2022-05-04 DIAGNOSIS — Z78.9 TAKES DIETARY SUPPLEMENTS: ICD-10-CM

## 2022-05-04 LAB
ANION GAP SERPL CALCULATED.3IONS-SCNC: 10 MMOL/L (ref 5–18)
BUN SERPL-MCNC: 11 MG/DL (ref 8–22)
CALCIUM SERPL-MCNC: 9.7 MG/DL (ref 8.5–10.5)
CHLORIDE BLD-SCNC: 106 MMOL/L (ref 98–107)
CO2 SERPL-SCNC: 26 MMOL/L (ref 22–31)
CREAT SERPL-MCNC: 0.88 MG/DL (ref 0.6–1.1)
ERYTHROCYTE [DISTWIDTH] IN BLOOD BY AUTOMATED COUNT: 13 % (ref 10–15)
GFR SERPL CREATININE-BSD FRML MDRD: 72 ML/MIN/1.73M2
GLUCOSE BLD-MCNC: 99 MG/DL (ref 70–125)
HBA1C MFR BLD: 5.3 %
HCT VFR BLD AUTO: 45.4 % (ref 35–47)
HGB BLD-MCNC: 14.4 G/DL (ref 11.7–15.7)
MCH RBC QN AUTO: 31 PG (ref 26.5–33)
MCHC RBC AUTO-ENTMCNC: 31.7 G/DL (ref 31.5–36.5)
MCV RBC AUTO: 98 FL (ref 78–100)
PLATELET # BLD AUTO: 297 10E3/UL (ref 150–450)
POTASSIUM BLD-SCNC: 4.2 MMOL/L (ref 3.5–5)
RBC # BLD AUTO: 4.65 10E6/UL (ref 3.8–5.2)
SODIUM SERPL-SCNC: 142 MMOL/L (ref 136–145)
TSH SERPL DL<=0.005 MIU/L-ACNC: 2.41 UIU/ML (ref 0.3–5)
WBC # BLD AUTO: 6.4 10E3/UL (ref 4–11)

## 2022-05-04 PROCEDURE — U0005 INFEC AGEN DETEC AMPLI PROBE: HCPCS | Mod: ORL | Performed by: INTERNAL MEDICINE

## 2022-05-04 PROCEDURE — 84443 ASSAY THYROID STIM HORMONE: CPT | Mod: ORL | Performed by: NURSE PRACTITIONER

## 2022-05-04 PROCEDURE — 80048 BASIC METABOLIC PNL TOTAL CA: CPT | Mod: ORL | Performed by: NURSE PRACTITIONER

## 2022-05-04 PROCEDURE — 36415 COLL VENOUS BLD VENIPUNCTURE: CPT | Mod: ORL | Performed by: NURSE PRACTITIONER

## 2022-05-04 PROCEDURE — 85027 COMPLETE CBC AUTOMATED: CPT | Mod: ORL | Performed by: NURSE PRACTITIONER

## 2022-05-04 PROCEDURE — P9603 ONE-WAY ALLOW PRORATED MILES: HCPCS | Mod: ORL | Performed by: NURSE PRACTITIONER

## 2022-05-04 PROCEDURE — 83036 HEMOGLOBIN GLYCOSYLATED A1C: CPT | Mod: ORL | Performed by: NURSE PRACTITIONER

## 2022-05-04 RX ORDER — OXYBUTYNIN CHLORIDE 5 MG/1
TABLET ORAL
Qty: 180 TABLET | Refills: 3 | Status: SHIPPED | OUTPATIENT
Start: 2022-05-04 | End: 2022-11-09

## 2022-05-04 RX ORDER — FOLIC ACID/MV,IRON,MIN/LUTEIN 0.4-18-25
TABLET ORAL
Qty: 90 TABLET | Refills: 3 | Status: SHIPPED | OUTPATIENT
Start: 2022-05-04 | End: 2023-01-01

## 2022-05-05 LAB — SARS-COV-2 RNA RESP QL NAA+PROBE: NEGATIVE

## 2022-05-09 ENCOUNTER — LAB REQUISITION (OUTPATIENT)
Dept: LAB | Facility: CLINIC | Age: 67
End: 2022-05-09
Payer: MEDICARE

## 2022-05-09 DIAGNOSIS — U07.1 COVID-19: ICD-10-CM

## 2022-05-11 PROCEDURE — U0003 INFECTIOUS AGENT DETECTION BY NUCLEIC ACID (DNA OR RNA); SEVERE ACUTE RESPIRATORY SYNDROME CORONAVIRUS 2 (SARS-COV-2) (CORONAVIRUS DISEASE [COVID-19]), AMPLIFIED PROBE TECHNIQUE, MAKING USE OF HIGH THROUGHPUT TECHNOLOGIES AS DESCRIBED BY CMS-2020-01-R: HCPCS | Mod: ORL | Performed by: INTERNAL MEDICINE

## 2022-05-12 LAB — SARS-COV-2 RNA RESP QL NAA+PROBE: NEGATIVE

## 2022-05-17 ENCOUNTER — LAB REQUISITION (OUTPATIENT)
Dept: LAB | Facility: CLINIC | Age: 67
End: 2022-05-17
Payer: MEDICARE

## 2022-05-17 DIAGNOSIS — U07.1 COVID-19: ICD-10-CM

## 2022-05-18 PROCEDURE — U0005 INFEC AGEN DETEC AMPLI PROBE: HCPCS | Mod: ORL | Performed by: INTERNAL MEDICINE

## 2022-05-19 LAB — SARS-COV-2 RNA RESP QL NAA+PROBE: NEGATIVE

## 2022-05-24 ENCOUNTER — LAB REQUISITION (OUTPATIENT)
Dept: LAB | Facility: CLINIC | Age: 67
End: 2022-05-24
Payer: MEDICARE

## 2022-05-24 DIAGNOSIS — U07.1 COVID-19: ICD-10-CM

## 2022-05-25 ENCOUNTER — ASSISTED LIVING VISIT (OUTPATIENT)
Dept: GERIATRICS | Facility: CLINIC | Age: 67
End: 2022-05-25

## 2022-05-25 VITALS
SYSTOLIC BLOOD PRESSURE: 110 MMHG | BODY MASS INDEX: 26.49 KG/M2 | RESPIRATION RATE: 18 BRPM | HEIGHT: 65 IN | WEIGHT: 159 LBS | HEART RATE: 70 BPM | TEMPERATURE: 97.2 F | OXYGEN SATURATION: 98 % | DIASTOLIC BLOOD PRESSURE: 74 MMHG

## 2022-05-25 DIAGNOSIS — Z53.9 ERRONEOUS ENCOUNTER--DISREGARD: Primary | ICD-10-CM

## 2022-05-25 PROCEDURE — U0003 INFECTIOUS AGENT DETECTION BY NUCLEIC ACID (DNA OR RNA); SEVERE ACUTE RESPIRATORY SYNDROME CORONAVIRUS 2 (SARS-COV-2) (CORONAVIRUS DISEASE [COVID-19]), AMPLIFIED PROBE TECHNIQUE, MAKING USE OF HIGH THROUGHPUT TECHNOLOGIES AS DESCRIBED BY CMS-2020-01-R: HCPCS | Mod: ORL | Performed by: INTERNAL MEDICINE

## 2022-05-25 NOTE — LETTER
5/25/2022        RE: Alberta Polanco  [facility]  [address] [address] [address]            This encounter was opened in error. Please disregard.        Sincerely,        Torri Khalil, NP

## 2022-05-26 LAB — SARS-COV-2 RNA RESP QL NAA+PROBE: NEGATIVE

## 2022-05-29 ENCOUNTER — HEALTH MAINTENANCE LETTER (OUTPATIENT)
Age: 67
End: 2022-05-29

## 2022-05-31 ENCOUNTER — LAB REQUISITION (OUTPATIENT)
Dept: LAB | Facility: CLINIC | Age: 67
End: 2022-05-31
Payer: MEDICARE

## 2022-05-31 DIAGNOSIS — U07.1 COVID-19: ICD-10-CM

## 2022-06-01 PROCEDURE — U0005 INFEC AGEN DETEC AMPLI PROBE: HCPCS | Mod: ORL | Performed by: INTERNAL MEDICINE

## 2022-06-02 LAB — SARS-COV-2 RNA RESP QL NAA+PROBE: NEGATIVE

## 2022-06-07 ENCOUNTER — LAB REQUISITION (OUTPATIENT)
Dept: LAB | Facility: CLINIC | Age: 67
End: 2022-06-07
Payer: MEDICARE

## 2022-06-07 DIAGNOSIS — U07.1 COVID-19: ICD-10-CM

## 2022-06-08 ENCOUNTER — ASSISTED LIVING VISIT (OUTPATIENT)
Dept: GERIATRICS | Facility: CLINIC | Age: 67
End: 2022-06-08

## 2022-06-08 DIAGNOSIS — F32.0 CURRENT MILD EPISODE OF MAJOR DEPRESSIVE DISORDER WITHOUT PRIOR EPISODE (H): ICD-10-CM

## 2022-06-08 DIAGNOSIS — Z53.29 REFUSAL OF CARE BY PATIENT: Primary | ICD-10-CM

## 2022-06-08 DIAGNOSIS — G30.0 EARLY ONSET ALZHEIMER'S DEMENTIA WITHOUT BEHAVIORAL DISTURBANCE (H): ICD-10-CM

## 2022-06-08 DIAGNOSIS — F02.80 EARLY ONSET ALZHEIMER'S DEMENTIA WITHOUT BEHAVIORAL DISTURBANCE (H): ICD-10-CM

## 2022-06-08 LAB — SARS-COV-2 RNA RESP QL NAA+PROBE: NEGATIVE

## 2022-06-08 PROCEDURE — U0003 INFECTIOUS AGENT DETECTION BY NUCLEIC ACID (DNA OR RNA); SEVERE ACUTE RESPIRATORY SYNDROME CORONAVIRUS 2 (SARS-COV-2) (CORONAVIRUS DISEASE [COVID-19]), AMPLIFIED PROBE TECHNIQUE, MAKING USE OF HIGH THROUGHPUT TECHNOLOGIES AS DESCRIBED BY CMS-2020-01-R: HCPCS | Mod: ORL | Performed by: INTERNAL MEDICINE

## 2022-06-08 NOTE — LETTER
6/8/2022        RE: Alberta Polanco  [facility]  [address] [address]        [facility] GERIATRICS  ACUTE/EPISODIC VISIT    [facility] Medical Record Number:  [id number]  Place of Service where encounter took place:  [facility] ([facility]) [[id number]]    Chief Complaint   Patient presents with     Incontinence     Refusal of cares        HPI:    Alberta Polanco is a 67 year old  (1955), who is being seen today for an episodic care visit.  HPI information obtained from: facility chart records, facility staff and patient report.    Today's concern is: Patient agitated during lunch hour; continuously getting out of chair and pacing. Per staff, more difficult to take care of. Reports she is feeling down as her spouse passed just a few weeks ago. Very anxious affect.   -Due to severe cognitive impairment, HPI difficult to obtain.     BP Readings from Last 3 Encounters:   05/19/22 110/74   01/12/22 118/79   11/23/21 94/71     Pulse Readings from Last 4 Encounters:   05/19/22 70   01/12/22 72   11/23/21 74   10/19/21 74     Wt Readings from Last 4 Encounters:   04/22/22 72.1 kg (159 lb)   04/22/22 72.1 kg (159 lb)   01/12/22 71.7 kg (158 lb)   11/23/21 66.7 kg (147 lb)             ALLERGIES:  No Known Allergies     MEDICATIONS:  Post Discharge Medication Reconciliation Status: patient was not discharged from an inpatient facility.     Current Outpatient Medications   Medication Sig Dispense Refill     aspirin (ASA) 81 MG EC tablet Take 1 tablet (81 mg) by mouth daily 30 tablet 11     CERTAVITE/ANTIOXIDANTS tablet TAKE 1 TABLET BY MOUTH ONCE DAILY 90 tablet 3     donepezil (ARICEPT) 5 MG tablet Take 1 tablet (5 mg) by mouth At Bedtime 90 tablet 3     memantine (NAMENDA) 10 MG tablet Take 10 mg by mouth 2 times daily       oxybutynin (DITROPAN) 5 MG tablet TAKE 1 TABLET BY MOUTH TWICE DAILY 180 tablet 3     oxybutynin ER (DITROPAN-XL) 5 MG 24 hr tablet Take 1 tablet  (5 mg) by mouth in the morning and 1 tablet (5 mg) in the evening. 180 tablet 3     traZODone (DESYREL) 50 MG tablet Take 0.5 tablets (25 mg) by mouth 2 times daily as needed for other (anxiety) 30 tablet 3     vitamin D3 (CHOLECALCIFEROL) 50 mcg (2000 units) tablet Take 1 tablet (50 mcg) by mouth daily 90 tablet 3     Medications reviewed:  Medications reconciled to facility chart and changes were made to reflect current medications as identified as above med list. Below are the changes that were made:   Medications stopped since last EPIC medication reconciliation:   There are no discontinued medications.    Medications started since last [facility] medication reconciliation:  No orders of the defined types were placed in this encounter.    REVIEW OF SYSTEMS:  4 point ROS neg other than the symptoms noted above in the HPI.  Unable to be obtained due to cognitive impairment  Review of Systems   Psychiatric/Behavioral: Positive for depression. The patient is nervous/anxious.        PHYSICAL EXAM:  There were no vitals taken for this visit.  Physical Exam  HENT:      Head: Normocephalic.      Mouth/Throat:      Mouth: Mucous membranes are dry.   Cardiovascular:      Rate and Rhythm: Normal rate and regular rhythm.   Pulmonary:      Effort: Pulmonary effort is normal.      Breath sounds: Normal breath sounds.   Abdominal:      Palpations: Abdomen is soft.   Musculoskeletal:      Cervical back: Normal range of motion.   Skin:     General: Skin is warm.   Neurological:      Mental Status: She is alert. Mental status is at baseline. She is confused.   Psychiatric:         Mood and Affect: Mood is depressed. Affect is tearful.         Behavior: Behavior is agitated and hyperactive.         Cognition and Memory: Cognition is impaired.         Judgment: Judgment is impulsive.           ASSESSMENT / PLAN:    ICD-10-CM    1. Refusal of care by patient  Z53.29    2. Early onset Alzheimer's dementia without behavioral disturbance (H)   G30.0     F02.80    3. Current mild episode of major depressive disorder without prior episode (H)  F32.0    -Chronic, progressive dementia now demonstrating increased agitation, difficulty with cares, and anxiety. Symptoms likely 2/2 recent death of spouse and disease progression.   -Celexa 10 mg/day   -Atarax 12.5 mg PO TID   -BMP 1 week.   -Continue Memory care support with medication administration, meals and safety.       Orders:  See above for new orders.     Electronically signed by  Torri Khalil NP                Sincerely,        Torri Khalil NP

## 2022-06-08 NOTE — PROGRESS NOTES
[facility] GERIATRICS  ACUTE/EPISODIC VISIT    [facility] Medical Record Number:  [id number]  Place of Service where encounter took place:  [facility]) [[id number]]    Chief Complaint   Patient presents with     Incontinence     Refusal of cares        HPI:    Alberta Polanco is a 67 year old  (1955), who is being seen today for an episodic care visit.  HPI information obtained from: facility chart records, facility staff and patient report.    Today's concern is: Patient agitated during lunch hour; continuously getting out of chair and pacing. Per staff, more difficult to take care of. Reports she is feeling down as her spouse passed just a few weeks ago. Very anxious affect.   -Due to severe cognitive impairment, HPI difficult to obtain.     BP Readings from Last 3 Encounters:   05/19/22 110/74   01/12/22 118/79   11/23/21 94/71     Pulse Readings from Last 4 Encounters:   05/19/22 70   01/12/22 72   11/23/21 74   10/19/21 74     Wt Readings from Last 4 Encounters:   04/22/22 72.1 kg (159 lb)   04/22/22 72.1 kg (159 lb)   01/12/22 71.7 kg (158 lb)   11/23/21 66.7 kg (147 lb)             ALLERGIES:  No Known Allergies     MEDICATIONS:  Post Discharge Medication Reconciliation Status: patient was not discharged from an inpatient facility.     Current Outpatient Medications   Medication Sig Dispense Refill     aspirin (ASA) 81 MG EC tablet Take 1 tablet (81 mg) by mouth daily 30 tablet 11     CERTAVITE/ANTIOXIDANTS tablet TAKE 1 TABLET BY MOUTH ONCE DAILY 90 tablet 3     donepezil (ARICEPT) 5 MG tablet Take 1 tablet (5 mg) by mouth At Bedtime 90 tablet 3     memantine (NAMENDA) 10 MG tablet Take 10 mg by mouth 2 times daily       oxybutynin (DITROPAN) 5 MG tablet TAKE 1 TABLET BY MOUTH TWICE DAILY 180 tablet 3     oxybutynin ER (DITROPAN-XL) 5 MG 24 hr tablet Take 1 tablet (5 mg) by mouth in the morning and 1 tablet (5 mg) in the evening. 180 tablet 3     traZODone (DESYREL) 50 MG  tablet Take 0.5 tablets (25 mg) by mouth 2 times daily as needed for other (anxiety) 30 tablet 3     vitamin D3 (CHOLECALCIFEROL) 50 mcg (2000 units) tablet Take 1 tablet (50 mcg) by mouth daily 90 tablet 3     Medications reviewed:  Medications reconciled to facility chart and changes were made to reflect current medications as identified as above med list. Below are the changes that were made:   Medications stopped since last EPIC medication reconciliation:   There are no discontinued medications.    Medications started since last [facility] medication reconciliation:  No orders of the defined types were placed in this encounter.    REVIEW OF SYSTEMS:  4 point ROS neg other than the symptoms noted above in the HPI.  Unable to be obtained due to cognitive impairment  Review of Systems   Psychiatric/Behavioral: Positive for depression. The patient is nervous/anxious.        PHYSICAL EXAM:  There were no vitals taken for this visit.  Physical Exam  HENT:      Head: Normocephalic.      Mouth/Throat:      Mouth: Mucous membranes are dry.   Cardiovascular:      Rate and Rhythm: Normal rate and regular rhythm.   Pulmonary:      Effort: Pulmonary effort is normal.      Breath sounds: Normal breath sounds.   Abdominal:      Palpations: Abdomen is soft.   Musculoskeletal:      Cervical back: Normal range of motion.   Skin:     General: Skin is warm.   Neurological:      Mental Status: She is alert. Mental status is at baseline. She is confused.   Psychiatric:         Mood and Affect: Mood is depressed. Affect is tearful.         Behavior: Behavior is agitated and hyperactive.         Cognition and Memory: Cognition is impaired.         Judgment: Judgment is impulsive.           ASSESSMENT / PLAN:    ICD-10-CM    1. Refusal of care by patient  Z53.29    2. Early onset Alzheimer's dementia without behavioral disturbance (H)  G30.0     F02.80    3. Current mild episode of major depressive disorder without prior episode (H)  F32.0     -Chronic, progressive dementia now demonstrating increased agitation, difficulty with cares, and anxiety. Symptoms likely 2/2 recent death of spouse and disease progression.   -Celexa 10 mg/day   -Atarax 12.5 mg PO TID   -BMP 1 week.   -Continue Memory care support with medication administration, meals and safety.       Orders:  See above for new orders.     Electronically signed by  Torri Khalil NP

## 2022-06-12 ENCOUNTER — LAB REQUISITION (OUTPATIENT)
Dept: LAB | Facility: CLINIC | Age: 67
End: 2022-06-12
Payer: MEDICARE

## 2022-06-12 DIAGNOSIS — R79.9 ABNORMAL FINDING OF BLOOD CHEMISTRY, UNSPECIFIED: ICD-10-CM

## 2022-06-14 LAB
ANION GAP SERPL CALCULATED.3IONS-SCNC: 11 MMOL/L (ref 5–18)
BUN SERPL-MCNC: 13 MG/DL (ref 8–22)
CALCIUM SERPL-MCNC: 9.7 MG/DL (ref 8.5–10.5)
CHLORIDE BLD-SCNC: 107 MMOL/L (ref 98–107)
CO2 SERPL-SCNC: 24 MMOL/L (ref 22–31)
CREAT SERPL-MCNC: 0.94 MG/DL (ref 0.6–1.1)
GFR SERPL CREATININE-BSD FRML MDRD: 67 ML/MIN/1.73M2
GLUCOSE BLD-MCNC: 77 MG/DL (ref 70–125)
POTASSIUM BLD-SCNC: 3.8 MMOL/L (ref 3.5–5)
SODIUM SERPL-SCNC: 142 MMOL/L (ref 136–145)

## 2022-06-14 PROCEDURE — 80048 BASIC METABOLIC PNL TOTAL CA: CPT | Mod: ORL | Performed by: NURSE PRACTITIONER

## 2022-06-14 PROCEDURE — P9604 ONE-WAY ALLOW PRORATED TRIP: HCPCS | Mod: ORL | Performed by: NURSE PRACTITIONER

## 2022-06-14 PROCEDURE — 36415 COLL VENOUS BLD VENIPUNCTURE: CPT | Mod: ORL | Performed by: NURSE PRACTITIONER

## 2022-06-15 RX ORDER — CITALOPRAM HYDROBROMIDE 10 MG/1
10 TABLET ORAL DAILY
Start: 2022-06-15 | End: 2022-06-29

## 2022-06-15 RX ORDER — HYDROXYZINE HYDROCHLORIDE 25 MG/1
12.5 TABLET, FILM COATED ORAL 3 TIMES DAILY
Start: 2022-06-15 | End: 2022-06-29

## 2022-06-15 ASSESSMENT — ENCOUNTER SYMPTOMS
DEPRESSION: 1
NERVOUS/ANXIOUS: 1

## 2022-06-23 PROCEDURE — U0003 INFECTIOUS AGENT DETECTION BY NUCLEIC ACID (DNA OR RNA); SEVERE ACUTE RESPIRATORY SYNDROME CORONAVIRUS 2 (SARS-COV-2) (CORONAVIRUS DISEASE [COVID-19]), AMPLIFIED PROBE TECHNIQUE, MAKING USE OF HIGH THROUGHPUT TECHNOLOGIES AS DESCRIBED BY CMS-2020-01-R: HCPCS | Mod: ORL | Performed by: INTERNAL MEDICINE

## 2022-06-24 ENCOUNTER — LAB REQUISITION (OUTPATIENT)
Dept: LAB | Facility: CLINIC | Age: 67
End: 2022-06-24
Payer: MEDICARE

## 2022-06-24 DIAGNOSIS — U07.1 COVID-19: ICD-10-CM

## 2022-06-24 LAB — SARS-COV-2 RNA RESP QL NAA+PROBE: NEGATIVE

## 2022-06-25 DIAGNOSIS — Z53.29 REFUSAL OF CARE BY PATIENT: ICD-10-CM

## 2022-06-27 ENCOUNTER — LAB REQUISITION (OUTPATIENT)
Dept: LAB | Facility: CLINIC | Age: 67
End: 2022-06-27
Payer: MEDICARE

## 2022-06-27 DIAGNOSIS — U07.1 COVID-19: ICD-10-CM

## 2022-06-28 PROCEDURE — U0003 INFECTIOUS AGENT DETECTION BY NUCLEIC ACID (DNA OR RNA); SEVERE ACUTE RESPIRATORY SYNDROME CORONAVIRUS 2 (SARS-COV-2) (CORONAVIRUS DISEASE [COVID-19]), AMPLIFIED PROBE TECHNIQUE, MAKING USE OF HIGH THROUGHPUT TECHNOLOGIES AS DESCRIBED BY CMS-2020-01-R: HCPCS | Mod: ORL | Performed by: INTERNAL MEDICINE

## 2022-06-29 LAB — SARS-COV-2 RNA RESP QL NAA+PROBE: NEGATIVE

## 2022-06-29 RX ORDER — CITALOPRAM HYDROBROMIDE 10 MG/1
TABLET ORAL
Qty: 90 TABLET | Refills: 3 | Status: SHIPPED | OUTPATIENT
Start: 2022-06-29 | End: 2023-01-01

## 2022-06-29 RX ORDER — HYDROXYZINE HYDROCHLORIDE 25 MG/1
TABLET, FILM COATED ORAL
Qty: 135 TABLET | Refills: 3 | Status: SHIPPED | OUTPATIENT
Start: 2022-06-29 | End: 2023-01-01

## 2022-08-31 ENCOUNTER — E-CONSULT (OUTPATIENT)
Dept: UROLOGY | Facility: CLINIC | Age: 67
End: 2022-08-31

## 2022-08-31 DIAGNOSIS — F02.80 EARLY ONSET ALZHEIMER'S DEMENTIA WITHOUT BEHAVIORAL DISTURBANCE (H): ICD-10-CM

## 2022-08-31 DIAGNOSIS — N32.81 OVERACTIVE BLADDER: Primary | ICD-10-CM

## 2022-08-31 DIAGNOSIS — G30.0 EARLY ONSET ALZHEIMER'S DEMENTIA WITHOUT BEHAVIORAL DISTURBANCE (H): ICD-10-CM

## 2022-08-31 PROCEDURE — 99207 E-CONSULT TO UROLOGY (ADULT OUTPT PROVIDER TO SPECIALIST WRITTEN QUESTION & RESPONSE): CPT | Performed by: NURSE PRACTITIONER

## 2022-08-31 NOTE — PROGRESS NOTES
ALL SMARTFIELDS MUST BE COMPLETED FOR PATIENT CARE AND BILLING    8/31/2022     E-Consult has been denied due to: Doesn't meet criteria for E-Consult - Did not include a specific clinical question, or no question provided.    Interprofessional consultation requested by:  Torri Kahlil NP      Clinical Question/Purpose: MY CLINICAL QUESTION IS: Patient's family requesting consult    Patient assessment and information reviewed:     Recommendations: please schedule a formal urology consult         The recommendations provided in this E-Consult are based on a review of clinical data pertinent to the clinical question presented, without a review of the patient's complete medical record or, the benefit of a comprehensive in-person or virtual patient evaluation. This consultation should not replace the clinical judgement and evaluation of the provider ordering this E-Consult. Any new clinical issues, or changes in patient status since the filing of this E-Consult will need to be taken into account when assessing these recommendations. Please contact me if you have further questions.    My total time spent reviewing clinical information and formulating assessment was 5 minutes.    Report sent automatically to requesting provider once signed.     Ashish Nelson MD

## 2022-09-16 ENCOUNTER — TELEPHONE (OUTPATIENT)
Dept: NEUROLOGY | Facility: CLINIC | Age: 67
End: 2022-09-16

## 2022-09-28 ENCOUNTER — ASSISTED LIVING VISIT (OUTPATIENT)
Dept: GERIATRICS | Facility: CLINIC | Age: 67
End: 2022-09-28
Payer: MEDICARE

## 2022-09-28 VITALS
TEMPERATURE: 96.8 F | BODY MASS INDEX: 34.55 KG/M2 | OXYGEN SATURATION: 95 % | HEIGHT: 56 IN | SYSTOLIC BLOOD PRESSURE: 115 MMHG | HEART RATE: 64 BPM | WEIGHT: 153.6 LBS | DIASTOLIC BLOOD PRESSURE: 76 MMHG | RESPIRATION RATE: 12 BRPM

## 2022-09-28 DIAGNOSIS — G25.0 ESSENTIAL TREMOR: Primary | ICD-10-CM

## 2022-09-28 NOTE — LETTER
"    9/28/2022        RE: Alberta Polanco  [address] [address]        [facility] GERIATRICS    Chief Complaint   Patient presents with     RECHECK     HPI:  Alberta Polanco is a 67 year old  (1955), who is being seen today for an episodic care visit at: [facility] ([facility]) [[id number]]. Today's concern is: Patient was seen in dining room and called over writer.  Patient pleasant with writer and mood and behaviors appear the same.  Writer noticed a new right sided essential tremor.  Tremor stopped with use of hand.  Has appointment with neurologist this month.    Allergies, and PMH/PSH reviewed in EPIC today.  REVIEW OF SYSTEMS:  Unobtainable secondary to cognitive impairment.     Objective:   /76   Pulse 64   Temp 96.8  F (36  C)   Resp 12   Ht 1.41 m (4' 7.5\")   Wt 69.7 kg (153 lb 9.6 oz)   SpO2 95%   BMI 35.06 kg/m    A & O x 3, NAD. Lungs CTA, non labored. RRR, S1/S2 w/o murmur,gallop or rub.  edema.  Abdomen soft, nontender, +BT'S. No focal neurological deficits.  alert to self only. Right essential, resting tremor       Labs done in SNF are in [facility]. Please refer to them using EPIC/Care Everywhere. and Recent labs in [facility] reviewed by me today.     Assessment/Plan:  (G25.0) Essential tremor  (primary encounter diagnosis)  Comment: New onset of right-sided resting tremor.  Is followed by neurology.  Patient reports bothersome  Plan:   Propanolol 20 mg p.o. twice daily          Post Medication Reconciliation Status:          Orders:  Propranolol 10 mg p.o. twice daily    Electronically signed by: Torri Khalil NP            Sincerely,        Torri Khalil NP    "

## 2022-09-28 NOTE — PROGRESS NOTES
"[facility] GERIATRICS    Chief Complaint   Patient presents with     RECHECK     HPI:  Alberta Polanco is a 67 year old  (1955), who is being seen today for an episodic care visit at: [facility]) [[id number]]. Today's concern is: Patient was seen in dining room and called over writer.  Patient pleasant with writer and mood and behaviors appear the same.  Writer noticed a new right sided essential tremor.  Tremor stopped with use of hand.  Has appointment with neurologist this month.    Allergies, and PMH/PSH reviewed in [facility] today.  REVIEW OF SYSTEMS:  Unobtainable secondary to cognitive impairment.     Objective:   /76   Pulse 64   Temp 96.8  F (36  C)   Resp 12   Ht 1.41 m (4' 7.5\")   Wt 69.7 kg (153 lb 9.6 oz)   SpO2 95%   BMI 35.06 kg/m    A & O x 3, NAD. Lungs CTA, non labored. RRR, S1/S2 w/o murmur,gallop or rub.  edema.  Abdomen soft, nontender, +BT'S. No focal neurological deficits.  alert to self only. Right essential, resting tremor       Labs done in SNF are in [facility]. Please refer to them using [facility]/Care Everywhere. and Recent labs in [facility] reviewed by me today.     Assessment/Plan:  (G25.0) Essential tremor  (primary encounter diagnosis)  Comment: New onset of right-sided resting tremor.  Is followed by neurology.  Patient reports bothersome  Plan:   Propanolol 20 mg p.o. twice daily          Post Medication Reconciliation Status:          Orders:  Propranolol 10 mg p.o. twice daily    Electronically signed by: Torri Khalil NP      "

## 2022-10-02 ENCOUNTER — HEALTH MAINTENANCE LETTER (OUTPATIENT)
Age: 67
End: 2022-10-02

## 2022-10-04 RX ORDER — PROPRANOLOL HYDROCHLORIDE 20 MG/1
20 TABLET ORAL 2 TIMES DAILY
Start: 2022-10-04 | End: 2023-01-01

## 2022-10-13 DIAGNOSIS — I67.9 CEREBRAL VASCULAR DISEASE: ICD-10-CM

## 2022-10-13 RX ORDER — ASPIRIN 81 MG/1
TABLET, COATED ORAL
Qty: 31 TABLET | Refills: 11 | Status: SHIPPED | OUTPATIENT
Start: 2022-10-13

## 2022-10-13 RX ORDER — MEMANTINE HYDROCHLORIDE 10 MG/1
TABLET ORAL
Qty: 62 TABLET | Refills: 11 | Status: SHIPPED | OUTPATIENT
Start: 2022-10-13 | End: 2023-01-01

## 2022-10-21 ENCOUNTER — ASSISTED LIVING VISIT (OUTPATIENT)
Dept: GERIATRICS | Facility: CLINIC | Age: 67
End: 2022-10-21
Payer: MEDICARE

## 2022-10-21 VITALS
BODY MASS INDEX: 34.82 KG/M2 | TEMPERATURE: 96.9 F | RESPIRATION RATE: 18 BRPM | DIASTOLIC BLOOD PRESSURE: 76 MMHG | HEART RATE: 80 BPM | HEIGHT: 56 IN | SYSTOLIC BLOOD PRESSURE: 102 MMHG | OXYGEN SATURATION: 97 % | WEIGHT: 154.8 LBS

## 2022-10-21 DIAGNOSIS — I67.9 CEREBRAL VASCULAR DISEASE: ICD-10-CM

## 2022-10-21 DIAGNOSIS — F01.50 MIXED DEMENTIA (H): Primary | ICD-10-CM

## 2022-10-21 DIAGNOSIS — G30.9 MIXED DEMENTIA (H): Primary | ICD-10-CM

## 2022-10-21 DIAGNOSIS — N32.81 OVERACTIVE BLADDER: ICD-10-CM

## 2022-10-21 DIAGNOSIS — F02.80 MIXED DEMENTIA (H): Primary | ICD-10-CM

## 2022-10-21 DIAGNOSIS — F41.9 ANXIETY: ICD-10-CM
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
## 2022-11-09 ENCOUNTER — ASSISTED LIVING VISIT (OUTPATIENT)
Dept: GERIATRICS | Facility: CLINIC | Age: 67
End: 2022-11-09
Payer: MEDICARE

## 2022-11-09 VITALS
WEIGHT: 154.8 LBS | SYSTOLIC BLOOD PRESSURE: 102 MMHG | OXYGEN SATURATION: 97 % | HEIGHT: 55 IN | TEMPERATURE: 95.5 F | BODY MASS INDEX: 35.83 KG/M2 | DIASTOLIC BLOOD PRESSURE: 76 MMHG | HEART RATE: 80 BPM | RESPIRATION RATE: 18 BRPM

## 2022-11-09 DIAGNOSIS — N32.81 OVERACTIVE BLADDER: ICD-10-CM

## 2022-11-09 RX ORDER — OXYBUTYNIN CHLORIDE 5 MG/1
2.5 TABLET ORAL 2 TIMES DAILY
Qty: 180 TABLET | Refills: 3 | COMMUNITY
Start: 2022-11-09 | End: 2022-11-21

## 2022-11-09 NOTE — LETTER
"    11/9/2022        RE: Alberta Polanco  [address] [address] [address]        [facility] GERIATRICS    Chief Complaint   Patient presents with     RECHECK     HPI:  Alberta Polanco is a 67 year old  (1955), who is being seen today for an episodic care visit at: [facility] ([facility]) [[id number]]. Today's concern is:   -Patient pleasant with writer today, offered quick smile. Up eating in dining room.   -Staff and patient without further concerns.   -HPI difficult to obtain 2/2 severe cognitive impairment.     Allergies, and PMH/PSH reviewed in EPIC today.  REVIEW OF SYSTEMS:  Unobtainable secondary to cognitive impairment.     Objective:   /76   Pulse 80   Temp (!) 95.5  F (35.3  C)   Resp 18   Ht 1.397 m (4' 7\")   Wt 70.2 kg (154 lb 12.8 oz)   SpO2 97%   BMI 35.98 kg/m    Alert to self only, NAD. Lungs CTA, non labored. RRR, S1/S2 w/o murmur,gallop or rub.  edema.  Abdomen soft, nontender, +BT'S. No focal neurological deficits.        Labs done in SNF are in [facility]. Please refer to them using [employer]/Care Everywhere. and Recent labs in [facility] reviewed by me today.     Assessment/Plan:  (N32.81) Overactive bladder  Comment: h/o uterine prolapse  Plan:   -Reduce oxybutynin 2.5 mg PO BID  -Continue tamsulosin 0.4 mg/day.     (G30.9) Mixed Dementia  Comment: Significant loss of memory. Functional and cognitive decline.   Plan:   -Continue Memory care support with medication administration, meals and safety.   -Continue Celexa 10 mg/day, memantine 10 mg PO BID, and aricept 2.5 mg/day.   -Expect further decline with progression of diease.   -Follow up with neurology as planned.      (I67.9) Cerebral Vascular Disease   Comment: by MRI.   Plan: ASA 81 mg/day for secondary prevention    MED REC REQUIRED{  Post Medication Reconciliation Status: patient was not discharged from an inpatient facility or TCU      Orders:  -Reduce oxybutynin 2.5 mg PO BID.      Electronically signed by: "   Torri Khalil, MICHELLE [facility]   10:56 PM            Sincerely,        Torri Khalil, NP

## 2022-11-09 NOTE — PROGRESS NOTES
"[facility] GERIATRICS    Chief Complaint   Patient presents with     RECHECK     HPI:  Alberta Polanco is a 67 year old  (1955), who is being seen today for an episodic care visit at: [facility]) [[id number]]. Today's concern is:   -Patient pleasant with writer today, offered quick smile. Up eating in dining room.   -Staff and patient without further concerns.   -HPI difficult to obtain 2/2 severe cognitive impairment.     Allergies, and PMH/PSH reviewed in EPIC today.  REVIEW OF SYSTEMS:  Unobtainable secondary to cognitive impairment.     Objective:   /76   Pulse 80   Temp (!) 95.5  F (35.3  C)   Resp 18   Ht 1.397 m (4' 7\")   Wt 70.2 kg (154 lb 12.8 oz)   SpO2 97%   BMI 35.98 kg/m    Alert to self only, NAD. Lungs CTA, non labored. RRR, S1/S2 w/o murmur,gallop or rub.  edema.  Abdomen soft, nontender, +BT'S. No focal neurological deficits.        Labs done in SNF are in [facility]. Please refer to them using [employer]/Care Everywhere. and Recent labs in [facility] reviewed by me today.     Assessment/Plan:  (N32.81) Overactive bladder  Comment: h/o uterine prolapse  Plan:   -Reduce oxybutynin 2.5 mg PO BID  -Continue tamsulosin 0.4 mg/day.     (G30.9) Mixed Dementia  Comment: Significant loss of memory. Functional and cognitive decline.   Plan:   -Continue Memory care support with medication administration, meals and safety.   -Continue Celexa 10 mg/day, memantine 10 mg PO BID, and aricept 2.5 mg/day.   -Expect further decline with progression of diease.   -Follow up with neurology as planned.      (I67.9) Cerebral Vascular Disease   Comment: by MRI.   Plan: ASA 81 mg/day for secondary prevention    MED REC REQUIRED{  Post Medication Reconciliation Status: patient was not discharged from an inpatient facility or TCU      Orders:  -Reduce oxybutynin 2.5 mg PO BID.      Electronically signed by:   MICHELLE Velazquez [facility]   10:56 PM      "

## 2022-11-21 DIAGNOSIS — N32.81 OVERACTIVE BLADDER: ICD-10-CM

## 2022-11-21 RX ORDER — OXYBUTYNIN CHLORIDE 5 MG/1
2.5 TABLET ORAL 2 TIMES DAILY
Qty: 180 TABLET | Refills: 3 | Status: SHIPPED | OUTPATIENT
Start: 2022-11-21

## 2022-12-07 ENCOUNTER — LAB REQUISITION (OUTPATIENT)
Dept: LAB | Facility: CLINIC | Age: 67
End: 2022-12-07
Payer: MEDICARE

## 2022-12-08 ENCOUNTER — LAB REQUISITION (OUTPATIENT)
Dept: LAB | Facility: CLINIC | Age: 67
End: 2022-12-08
Payer: MEDICARE

## 2022-12-08 DIAGNOSIS — J11.1 INFLUENZA DUE TO UNIDENTIFIED INFLUENZA VIRUS WITH OTHER RESPIRATORY MANIFESTATIONS: ICD-10-CM

## 2022-12-08 LAB
FLUAV RNA SPEC QL NAA+PROBE: NEGATIVE
FLUBV RNA RESP QL NAA+PROBE: NEGATIVE
RSV RNA SPEC NAA+PROBE: NEGATIVE
SARS-COV-2 RNA RESP QL NAA+PROBE: NEGATIVE

## 2022-12-08 PROCEDURE — 87637 SARSCOV2&INF A&B&RSV AMP PRB: CPT | Mod: ORL | Performed by: NURSE PRACTITIONER

## 2022-12-12 DIAGNOSIS — Z78.9 TAKES DIETARY SUPPLEMENTS: ICD-10-CM

## 2022-12-13 RX ORDER — CHOLECALCIFEROL (VITAMIN D3) 50 MCG
TABLET ORAL
Qty: 28 TABLET | Refills: 97 | Status: SHIPPED | OUTPATIENT
Start: 2022-12-13

## 2022-12-14 ENCOUNTER — ASSISTED LIVING VISIT (OUTPATIENT)
Dept: GERIATRICS | Facility: CLINIC | Age: 67
End: 2022-12-14
Payer: MEDICARE

## 2022-12-14 VITALS
RESPIRATION RATE: 18 BRPM | HEART RATE: 69 BPM | SYSTOLIC BLOOD PRESSURE: 114 MMHG | OXYGEN SATURATION: 95 % | TEMPERATURE: 96.8 F | HEIGHT: 55 IN | DIASTOLIC BLOOD PRESSURE: 74 MMHG | BODY MASS INDEX: 36.93 KG/M2 | WEIGHT: 159.6 LBS

## 2022-12-14 DIAGNOSIS — F02.80 MIXED DEMENTIA (H): Primary | ICD-10-CM

## 2022-12-14 DIAGNOSIS — F41.9 ANXIETY: ICD-10-CM

## 2022-12-14 DIAGNOSIS — F69 ADULT BEHAVIOR PROBLEM: ICD-10-CM

## 2022-12-14 DIAGNOSIS — G30.9 MIXED DEMENTIA (H): Primary | ICD-10-CM

## 2022-12-14 DIAGNOSIS — F01.50 MIXED DEMENTIA (H): Primary | ICD-10-CM

## 2022-12-14 NOTE — LETTER
"    12/14/2022        RE: Alberta Polanco  [address] [address] [address]        [facility] GERIATRICS    Chief Complaint   Patient presents with     RECHECK     HPI:  Alberta Polanco is a 67 year old  (1955), who is being seen today for an episodic care visit at: [facility] ([facility]) [[id number]]. Today's concern is:   Patient is a 67-year-old female with past medical history significant for memory loss, overactive bladder, cerebral vascular disease, history of EtOH abuse and uterine prolapse  The patient was seen because staff reports patient is having increased anxiety from stealing other people's belongings and having behaviors.  Went to neuro appointment on Wednesday 12-12, with suggested increase Aricept.  Aricept had previously been discontinued per pharmacy recommendation.  Patient is followed by neurology and has been prescribing for behaviors.  Today patient is pleasant sitting at [facility].  Happy and demonstrated no behaviors at this time.  No other complaints per staff.    Allergies, and PMH/PSH reviewed in [facility] today.  REVIEW OF SYSTEMS:  Unobtainable secondary to cognitive impairment.     Objective:   /74   Pulse 69   Temp 96.8  F (36  C)   Resp 18   Ht 1.397 m (4' 7\")   Wt 72.4 kg (159 lb 9.6 oz)   SpO2 95%   BMI 37.09 kg/m    A & O x 2, NAD. Lungs CTA, non labored. RRR, S1/S2 w/o murmur,gallop or rub.  edema.  Abdomen soft, nontender, +BT'S. No focal neurological deficits.        Labs done in SNF are in [facility]. Please refer to them using [employer]/Care Everywhere. and Recent labs in [facility] reviewed by me today.     Assessment/Plan:    ICD-10-CM    1. Mixed dementia (H)  G30.9     F01.50     F02.80       2. Anxiety  F41.9       3. Adult behavior problem  F69         Patient has chronic progressive dementia, now with increased behaviors.  Patient is followed by neurology who appears to be managing medications for anxiety and behaviors.  Recently " followed up with recommendations to increase Aricept.  Aricept had previously discontinued secondary to pharmacy recommendations.    Orders given for staff to follow-up with neurology.  Writer happy to take over medication management for behaviors if okayed by neuro.    Expect further, progression of disease.  Memory care support with medication ministration, meals, safety.      MED REC REQUIRED  Post Medication Reconciliation Status: patient was not discharged from an inpatient facility or TCU      Orders:  -Follow-up neurology for Aricept orders.    Electronically signed by:  Torri Khalil NP            Sincerely,        Torri Khalil NP

## 2022-12-14 NOTE — PROGRESS NOTES
"[facility] GERIATRICS    Chief Complaint   Patient presents with     RECHECK     HPI:  Alberta Polanco is a 67 year old  (1955), who is being seen today for an episodic care visit at: [facility]) [[id number]]. Today's concern is:   Patient is a 67-year-old female with past medical history significant for memory loss, overactive bladder, cerebral vascular disease, history of EtOH abuse and uterine prolapse  The patient was seen because staff reports patient is having increased anxiety from stealing other people's belongings and having behaviors.  Went to neuro appointment on Wednesday 12-12, with suggested increase Aricept.  Aricept had previously been discontinued per pharmacy recommendation.  Patient is followed by neurology and has been prescribing for behaviors.  Today patient is pleasant sitting at [facility].  Happy and demonstrated no behaviors at this time.  No other complaints per staff.    Allergies, and PMH/PSH reviewed in EPIC today.  REVIEW OF SYSTEMS:  Unobtainable secondary to cognitive impairment.     Objective:   /74   Pulse 69   Temp 96.8  F (36  C)   Resp 18   Ht 1.397 m (4' 7\")   Wt 72.4 kg (159 lb 9.6 oz)   SpO2 95%   BMI 37.09 kg/m    A & O x 2, NAD. Lungs CTA, non labored. RRR, S1/S2 w/o murmur,gallop or rub.  edema.  Abdomen soft, nontender, +BT'S. No focal neurological deficits.        Labs done in SNF are in [facility]. Please refer to them using [employer]/Care Everywhere. and Recent labs in [facility] reviewed by me today.     Assessment/Plan:    ICD-10-CM    1. Mixed dementia (H)  G30.9     F01.50     F02.80       2. Anxiety  F41.9       3. Adult behavior problem  F69         Patient has chronic progressive dementia, now with increased behaviors.  Patient is followed by neurology who appears to be managing medications for anxiety and behaviors.  Recently followed up with recommendations to increase Aricept.  Aricept had previously discontinued " secondary to pharmacy recommendations.    Orders given for staff to follow-up with neurology.  Writer happy to take over medication management for behaviors if okayed by neuro.    Expect further, progression of disease.  Memory care support with medication ministration, meals, safety.      MED REC REQUIRED  Post Medication Reconciliation Status: patient was not discharged from an inpatient facility or TCU      Orders:  -Follow-up neurology for Aricept orders.    Electronically signed by:  Torri Khalil NP

## 2023-01-01 ENCOUNTER — HEALTH MAINTENANCE LETTER (OUTPATIENT)
Age: 68
End: 2023-01-01

## 2023-01-01 ENCOUNTER — LAB REQUISITION (OUTPATIENT)
Dept: LAB | Facility: CLINIC | Age: 68
End: 2023-01-01
Payer: MEDICARE

## 2023-01-01 ENCOUNTER — MEDICAL CORRESPONDENCE (OUTPATIENT)
Dept: HEALTH INFORMATION MANAGEMENT | Facility: CLINIC | Age: 68
End: 2023-01-01
Payer: MEDICARE

## 2023-01-01 ENCOUNTER — TELEPHONE (OUTPATIENT)
Dept: GERIATRICS | Facility: CLINIC | Age: 68
End: 2023-01-01
Payer: MEDICARE

## 2023-01-01 ENCOUNTER — ASSISTED LIVING VISIT (OUTPATIENT)
Dept: GERIATRICS | Facility: CLINIC | Age: 68
End: 2023-01-01
Payer: MEDICARE

## 2023-01-01 ENCOUNTER — DOCUMENTATION ONLY (OUTPATIENT)
Dept: OTHER | Facility: CLINIC | Age: 68
End: 2023-01-01
Payer: MEDICARE

## 2023-01-01 ENCOUNTER — DOCUMENTATION ONLY (OUTPATIENT)
Dept: GERIATRICS | Facility: CLINIC | Age: 68
End: 2023-01-01
Payer: MEDICARE

## 2023-01-01 VITALS
TEMPERATURE: 97 F | DIASTOLIC BLOOD PRESSURE: 73 MMHG | SYSTOLIC BLOOD PRESSURE: 104 MMHG | HEART RATE: 58 BPM | BODY MASS INDEX: 31.94 KG/M2 | OXYGEN SATURATION: 95 % | WEIGHT: 138 LBS | RESPIRATION RATE: 16 BRPM | HEIGHT: 55 IN

## 2023-01-01 VITALS
SYSTOLIC BLOOD PRESSURE: 112 MMHG | RESPIRATION RATE: 18 BRPM | BODY MASS INDEX: 36.8 KG/M2 | WEIGHT: 159 LBS | DIASTOLIC BLOOD PRESSURE: 75 MMHG | TEMPERATURE: 96.6 F | HEIGHT: 55 IN | HEART RATE: 60 BPM

## 2023-01-01 VITALS
SYSTOLIC BLOOD PRESSURE: 104 MMHG | HEIGHT: 55 IN | WEIGHT: 138 LBS | TEMPERATURE: 97 F | HEART RATE: 58 BPM | DIASTOLIC BLOOD PRESSURE: 73 MMHG | OXYGEN SATURATION: 95 % | RESPIRATION RATE: 16 BRPM | BODY MASS INDEX: 31.94 KG/M2

## 2023-01-01 VITALS
RESPIRATION RATE: 26 BRPM | WEIGHT: 138 LBS | SYSTOLIC BLOOD PRESSURE: 111 MMHG | HEART RATE: 98 BPM | DIASTOLIC BLOOD PRESSURE: 58 MMHG | BODY MASS INDEX: 31.94 KG/M2 | TEMPERATURE: 98 F | OXYGEN SATURATION: 95 % | HEIGHT: 55 IN

## 2023-01-01 DIAGNOSIS — G30.0 EARLY ONSET ALZHEIMER'S DEMENTIA WITHOUT BEHAVIORAL DISTURBANCE (H): Primary | ICD-10-CM

## 2023-01-01 DIAGNOSIS — N30.01 ACUTE CYSTITIS WITH HEMATURIA: Primary | ICD-10-CM

## 2023-01-01 DIAGNOSIS — F02.80 EARLY ONSET ALZHEIMER'S DEMENTIA WITHOUT BEHAVIORAL DISTURBANCE (H): Primary | ICD-10-CM

## 2023-01-01 DIAGNOSIS — R63.4 WEIGHT LOSS: ICD-10-CM

## 2023-01-01 DIAGNOSIS — F02.80 DEMENTIA IN OTHER DISEASES CLASSIFIED ELSEWHERE, UNSPECIFIED SEVERITY, WITHOUT BEHAVIORAL DISTURBANCE, PSYCHOTIC DISTURBANCE, MOOD DISTURBANCE, AND ANXIETY (H): ICD-10-CM

## 2023-01-01 DIAGNOSIS — I67.9 CEREBRAL VASCULAR DISEASE: ICD-10-CM

## 2023-01-01 DIAGNOSIS — G30.9 MIXED DEMENTIA (H): ICD-10-CM

## 2023-01-01 DIAGNOSIS — G30.9 ALZHEIMER'S DISEASE, UNSPECIFIED (CODE) (H): ICD-10-CM

## 2023-01-01 DIAGNOSIS — F01.50 MIXED DEMENTIA (H): ICD-10-CM

## 2023-01-01 DIAGNOSIS — Z78.9 TAKES DIETARY SUPPLEMENTS: ICD-10-CM

## 2023-01-01 DIAGNOSIS — G30.0 EARLY ONSET ALZHEIMER'S DEMENTIA WITHOUT BEHAVIORAL DISTURBANCE (H): ICD-10-CM

## 2023-01-01 DIAGNOSIS — F32.0 CURRENT MILD EPISODE OF MAJOR DEPRESSIVE DISORDER WITHOUT PRIOR EPISODE (H): ICD-10-CM

## 2023-01-01 DIAGNOSIS — N18.9 CHRONIC KIDNEY DISEASE, UNSPECIFIED: ICD-10-CM

## 2023-01-01 DIAGNOSIS — F02.80 MIXED DEMENTIA (H): ICD-10-CM

## 2023-01-01 DIAGNOSIS — R62.7 FAILURE TO THRIVE IN ADULT: ICD-10-CM

## 2023-01-01 DIAGNOSIS — F32.0 CURRENT MILD EPISODE OF MAJOR DEPRESSIVE DISORDER WITHOUT PRIOR EPISODE (H): Primary | ICD-10-CM

## 2023-01-01 DIAGNOSIS — Z53.29 REFUSAL OF CARE BY PATIENT: ICD-10-CM

## 2023-01-01 DIAGNOSIS — R23.1 PALE COMPLEXION: ICD-10-CM

## 2023-01-01 DIAGNOSIS — R63.4 ABNORMAL WEIGHT LOSS: ICD-10-CM

## 2023-01-01 DIAGNOSIS — F02.80 EARLY ONSET ALZHEIMER'S DEMENTIA WITHOUT BEHAVIORAL DISTURBANCE (H): ICD-10-CM

## 2023-01-01 DIAGNOSIS — Z20.822 CONTACT WITH AND (SUSPECTED) EXPOSURE TO COVID-19: ICD-10-CM

## 2023-01-01 LAB
ALBUMIN UR-MCNC: NEGATIVE MG/DL
APPEARANCE UR: CLEAR
BACTERIA UR CULT: NORMAL
BACTERIA UR CULT: NORMAL
BILIRUB UR QL STRIP: NEGATIVE
COLOR UR AUTO: YELLOW
FLUAV RNA SPEC QL NAA+PROBE: NEGATIVE
FLUBV RNA RESP QL NAA+PROBE: NEGATIVE
GLUCOSE UR STRIP-MCNC: NEGATIVE MG/DL
HGB UR QL STRIP: NEGATIVE
KETONES UR STRIP-MCNC: NEGATIVE MG/DL
LEUKOCYTE ESTERASE UR QL STRIP: ABNORMAL
Lab: NORMAL
Lab: NORMAL
MISCELLANEOUS TEST 1 (ARUP): NORMAL
MUCOUS THREADS #/AREA URNS LPF: PRESENT /LPF
NITRATE UR QL: NEGATIVE
PERFORMING LABORATORY: NORMAL
PERFORMING LABORATORY: NORMAL
PH UR STRIP: 7 [PH] (ref 5–7)
RBC URINE: 2 /HPF
RSV RNA SPEC NAA+PROBE: NEGATIVE
SARS-COV-2 RNA RESP QL NAA+PROBE: NEGATIVE
SP GR UR STRIP: 1.01 (ref 1–1.03)
SPECIMEN STATUS: NORMAL
SPECIMEN STATUS: NORMAL
SQUAMOUS EPITHELIAL: 2 /HPF
TEST NAME: NORMAL
TEST NAME: NORMAL
UROBILINOGEN UR STRIP-MCNC: NORMAL MG/DL
WBC URINE: 25 /HPF

## 2023-01-01 PROCEDURE — 99348 HOME/RES VST EST LOW MDM 30: CPT | Performed by: NURSE PRACTITIONER

## 2023-01-01 PROCEDURE — 99350 HOME/RES VST EST HIGH MDM 60: CPT | Performed by: NURSE PRACTITIONER

## 2023-01-01 PROCEDURE — P9604 ONE-WAY ALLOW PRORATED TRIP: HCPCS | Mod: ORL | Performed by: PSYCHIATRY & NEUROLOGY

## 2023-01-01 PROCEDURE — 87637 SARSCOV2&INF A&B&RSV AMP PRB: CPT | Mod: ORL | Performed by: NURSE PRACTITIONER

## 2023-01-01 PROCEDURE — 36415 COLL VENOUS BLD VENIPUNCTURE: CPT | Mod: ORL | Performed by: PSYCHIATRY & NEUROLOGY

## 2023-01-01 PROCEDURE — 87086 URINE CULTURE/COLONY COUNT: CPT | Mod: ORL | Performed by: NURSE PRACTITIONER

## 2023-01-01 PROCEDURE — 81401 MOPATH PROCEDURE LEVEL 2: CPT | Performed by: PSYCHIATRY & NEUROLOGY

## 2023-01-01 PROCEDURE — 84999 UNLISTED CHEMISTRY PROCEDURE: CPT | Performed by: PSYCHIATRY & NEUROLOGY

## 2023-01-01 PROCEDURE — 99349 HOME/RES VST EST MOD MDM 40: CPT | Performed by: NURSE PRACTITIONER

## 2023-01-01 PROCEDURE — 81001 URINALYSIS AUTO W/SCOPE: CPT | Mod: ORL | Performed by: NURSE PRACTITIONER

## 2023-01-01 RX ORDER — TAMSULOSIN HYDROCHLORIDE 0.4 MG/1
0.4 CAPSULE ORAL DAILY
COMMUNITY

## 2023-01-01 RX ORDER — MIRTAZAPINE 15 MG/1
TABLET, FILM COATED ORAL
Qty: 90 TABLET | Refills: 97 | Status: SHIPPED | OUTPATIENT
Start: 2023-01-01

## 2023-01-01 RX ORDER — MEMANTINE HYDROCHLORIDE 10 MG/1
TABLET ORAL
Qty: 180 TABLET | Refills: 97 | Status: SHIPPED | OUTPATIENT
Start: 2023-01-01

## 2023-01-01 RX ORDER — HYDROXYZINE HYDROCHLORIDE 25 MG/1
TABLET, FILM COATED ORAL
Qty: 135 TABLET | Refills: 97 | Status: SHIPPED | OUTPATIENT
Start: 2023-01-01

## 2023-01-01 RX ORDER — MIRTAZAPINE 15 MG/1
15 TABLET, FILM COATED ORAL AT BEDTIME
Start: 2023-01-01 | End: 2023-01-01

## 2023-01-01 RX ORDER — FOLIC ACID/MV,IRON,MIN/LUTEIN 0.4-18-25
TABLET ORAL
Qty: 28 TABLET | Refills: 97 | Status: SHIPPED | OUTPATIENT
Start: 2023-01-01

## 2023-01-01 RX ORDER — QUETIAPINE FUMARATE 25 MG/1
25 TABLET, FILM COATED ORAL 2 TIMES DAILY
COMMUNITY

## 2023-01-01 RX ORDER — BUSPIRONE HYDROCHLORIDE 15 MG/1
7.5 TABLET ORAL 2 TIMES DAILY
Start: 2023-01-01

## 2023-01-01 RX ORDER — BUSPIRONE HYDROCHLORIDE 7.5 MG/1
TABLET ORAL
Qty: 180 TABLET | Refills: 3 | Status: SHIPPED | OUTPATIENT
Start: 2023-01-01 | End: 2023-01-01

## 2023-01-01 RX ORDER — CITALOPRAM HYDROBROMIDE 10 MG/1
TABLET ORAL
Qty: 90 TABLET | Refills: 97 | Status: SHIPPED | OUTPATIENT
Start: 2023-01-01

## 2023-01-01 RX ORDER — ACETAMINOPHEN 325 MG/1
650 TABLET ORAL EVERY 4 HOURS PRN
COMMUNITY

## 2023-01-06 DIAGNOSIS — F02.80 EARLY ONSET ALZHEIMER'S DEMENTIA WITHOUT BEHAVIORAL DISTURBANCE (H): Primary | ICD-10-CM

## 2023-01-06 DIAGNOSIS — G30.0 EARLY ONSET ALZHEIMER'S DEMENTIA WITHOUT BEHAVIORAL DISTURBANCE (H): Primary | ICD-10-CM

## 2023-01-06 RX ORDER — DONEPEZIL HYDROCHLORIDE 10 MG/1
TABLET, FILM COATED ORAL
Qty: 90 TABLET | Refills: 97 | Status: SHIPPED | OUTPATIENT
Start: 2023-01-06

## 2023-01-10 ENCOUNTER — TELEPHONE (OUTPATIENT)
Dept: GERIATRICS | Facility: CLINIC | Age: 68
End: 2023-01-10

## 2023-01-10 NOTE — TELEPHONE ENCOUNTER
[facility] Triage Nurse Telephone Encounter    Provider: MICHELLE Mckeon CNP  Facility: [facility]  Facility Type:  AL    Caller: Iglesia  Call Back Number: [phone]    Allergies:  No Known Allergies     Reason for call:     Nursing is calling to report that the pt is having increased incontinence, usually she has a small pad but she has been soaking through these and actually leaving a trail on the floor. Afebrile and no complaints at this time.       Verbal Order/Direction given by Provider: Obtain a UA/UC    Provider giving Order:  MICHELLE Mckeon CNP    Verbal Order given to: Iglesia Banda RN

## 2023-03-01 NOTE — LETTER
"    3/1/2023        RE: Alberta Polanco  [address] [address]        [facility] GERIATRICS    Chief Complaint   Patient presents with     RECHECK     increased behaviors, refusing cares, taking food off other resident's plates, increased incontinence (not able to collect a urine as of right now)     HPI:  Alberta Polanco is a 67 year old  (1955), who is being seen today for an episodic care visit at: [facility]) [[id number]]. Today's concern is: Patient with increased behaviors including refusing cares, taking food from others and increased incontinence. HPI difficult to obtain 2/2 baseline impaired cognition.     BP Readings from Last 3 Encounters:   03/01/23 112/75   12/14/22 114/74   11/09/22 102/76     Pulse Readings from Last 4 Encounters:   03/01/23 60   12/14/22 69   11/09/22 80   10/21/22 80         Allergies, and PMH/PSH reviewed in [facility] today.  REVIEW OF SYSTEMS:  Unobtainable secondary to cognitive impairment.     Objective:   /75   Pulse 60   Temp (!) 96.6  F (35.9  C)   Resp 18   Ht 1.397 m (4' 7\")   Wt 72.1 kg (159 lb)   BMI 36.96 kg/m    A & O x 2, NAD. Lungs CTA, non labored. RRR, S1/S2 w/o murmur,gallop or rub.  edema.  Abdomen soft, nontender, +BT'S. No focal neurological deficits.        Recent labs in [facility] reviewed by me today.     Assessment/Plan:     Current mild episode of major depressive disorder without prior episode (H)  Mixed dementia (H)  -Patient with chronic, progressive dementia. Refusing cares and difficult to assess.   -Busperone 7.5 mg PO BID     MED REC REQUIRED  Post Medication Reconciliation Status: patient was not discharged from an inpatient facility or TCU      Orders:  Buspar 7.5 mg PO BID    Electronically signed by:   Torri Khalil NP              Sincerely,        Torri Khalil NP      "

## 2023-03-01 NOTE — PROGRESS NOTES
"[facility] GERIATRICS    Chief Complaint   Patient presents with     RECHECK     increased behaviors, refusing cares, taking food off other resident's plates, increased incontinence (not able to collect a urine as of right now)     HPI:  Alberta Polanco is a 67 year old  (1955), who is being seen today for an episodic care visit at: [facility] ([facility]) [[id number]]. Today's concern is: Patient with increased behaviors including refusing cares, taking food from others and increased incontinence. HPI difficult to obtain 2/2 baseline impaired cognition.     BP Readings from Last 3 Encounters:   03/01/23 112/75   12/14/22 114/74   11/09/22 102/76     Pulse Readings from Last 4 Encounters:   03/01/23 60   12/14/22 69   11/09/22 80   10/21/22 80         Allergies, and PMH/PSH reviewed in [employer] today.  REVIEW OF SYSTEMS:  Unobtainable secondary to cognitive impairment.     Objective:   /75   Pulse 60   Temp (!) 96.6  F (35.9  C)   Resp 18   Ht 1.397 m (4' 7\")   Wt 72.1 kg (159 lb)   BMI 36.96 kg/m    A & O x 2, NAD. Lungs CTA, non labored. RRR, S1/S2 w/o murmur,gallop or rub.  edema.  Abdomen soft, nontender, +BT'S. No focal neurological deficits.        Recent labs in [facility] reviewed by me today.     Assessment/Plan:     Current mild episode of major depressive disorder without prior episode (H)  Mixed dementia (H)  -Patient with chronic, progressive dementia. Refusing cares and difficult to assess.   -Busperone 7.5 mg PO BID     MED REC REQUIRED  Post Medication Reconciliation Status: patient was not discharged from an inpatient facility or TCU      Orders:  Buspar 7.5 mg PO BID    Electronically signed by:   Torri Khalil NP        "

## 2023-03-10 PROBLEM — F32.0 CURRENT MILD EPISODE OF MAJOR DEPRESSIVE DISORDER WITHOUT PRIOR EPISODE (H): Status: ACTIVE | Noted: 2023-01-01

## 2023-07-12 NOTE — PROGRESS NOTES
"[facility] GERIATRICS    Chief Complaint   Patient presents with     RECHECK     Decreased intake, pallor, refuse cares     HPI:  Alberta Polanco is a 68 year old  (1955), who is being seen today for an episodic care visit at: [facility] ([facility]) [[id number]]. Today's concern is:   Citlali is a 68 year old with early onset dementia residing in [facility]. More resistant to exam today and pushed writer out of room. Writer was able to coax patient to the diningroom for lunch, but refused to eat. Noted to be pale and reported feeling different. Per chart review, has had a 12 pound weight loss since June 6th. Staff concerned she is different than baseline. Unable to get labs until next Tuesday, unable to get urine sample, patient is incontinent.     BP Readings from Last 3 Encounters:   07/12/23 104/73   03/01/23 112/75   12/14/22 114/74     Pulse Readings from Last 4 Encounters:   07/12/23 58   03/01/23 60   12/14/22 69   11/09/22 80     Wt Readings from Last 4 Encounters:   07/12/23 62.6 kg (138 lb)   03/01/23 72.1 kg (159 lb)   12/14/22 72.4 kg (159 lb 9.6 oz)   11/09/22 70.2 kg (154 lb 12.8 oz)         Allergies, and PMH/PSH reviewed in EPIC today.  REVIEW OF SYSTEMS:  Unobtainable secondary to cognitive impairment.     Objective:   /73   Pulse 58   Temp 97  F (36.1  C)   Resp 16   Ht 1.397 m (4' 7\")   Wt 62.6 kg (138 lb)   SpO2 95%   BMI 32.07 kg/m    A & O x 1, NAD. Lungs CTA, non labored. RRR, S1/S2 w/o murmur,gallop or rub.  edema.  Abdomen soft, nontender, +BT'S. No focal neurological deficits. Pale complexity      Recent labs in [facility] reviewed by me today.     Assessment/Plan:    ICD-10-CM    1. Early onset Alzheimer's dementia without behavioral disturbance (H)  G30.0     F02.80       2. Weight loss  R63.4       3. Pale complexion  R23.1         Chronic, progressing dementia. Increased confusion from baseline with refusal to eat. Weight loss of 12 lbs noted " over month. Pale complexity and reporting feeling different. Low BPs.     Left message for daughter regarding assessment in ED given unable to obtain labs and urine.     Remeron 7.5 mg PO q HS for appetite stimulation.     MED REC REQUIRED  Post Medication Reconciliation Status: patient was not discharged from an inpatient facility or TCU      Orders:  1. BMP and CBC next Tuesday  2. Remeron 7.5 mg po HS  3. Sent to ED for evaluation if ok with daughter.     Electronically signed by:  Torri Khalil NP

## 2023-07-12 NOTE — LETTER
"    7/12/2023        RE: Alberta Polanco  [address] [address]        [facility] GERIATRICS    Chief Complaint   Patient presents with     RECHECK     Decreased intake, pallor, refuse cares     HPI:  Alberta Polanco is a 68 year old  (1955), who is being seen today for an episodic care visit at: [facility] ([facility]) [[id number]]. Today's concern is:   Citlali is a 68 year old with early onset dementia residing in [facility]. More resistant to exam today and pushed writer out of room. Writer was able to coax patient to the diningroom for lunch, but refused to eat. Noted to be pale and reported feeling different. Per chart review, has had a 12 pound weight loss since June 6th. Staff concerned she is different than baseline. Unable to get labs until next Tuesday, unable to get urine sample, patient is incontinent.     BP Readings from Last 3 Encounters:   07/12/23 104/73   03/01/23 112/75   12/14/22 114/74     Pulse Readings from Last 4 Encounters:   07/12/23 58   03/01/23 60   12/14/22 69   11/09/22 80     Wt Readings from Last 4 Encounters:   07/12/23 62.6 kg (138 lb)   03/01/23 72.1 kg (159 lb)   12/14/22 72.4 kg (159 lb 9.6 oz)   11/09/22 70.2 kg (154 lb 12.8 oz)         Allergies, and PMH/PSH reviewed in [facility] today.  REVIEW OF SYSTEMS:  Unobtainable secondary to cognitive impairment.     Objective:   /73   Pulse 58   Temp 97  F (36.1  C)   Resp 16   Ht 1.397 m (4' 7\")   Wt 62.6 kg (138 lb)   SpO2 95%   BMI 32.07 kg/m    A & O x 1, NAD. Lungs CTA, non labored. RRR, S1/S2 w/o murmur,gallop or rub.  edema.  Abdomen soft, nontender, +BT'S. No focal neurological deficits. Pale complexity      Recent labs in [facility] reviewed by me today.     Assessment/Plan:    ICD-10-CM    1. Early onset Alzheimer's dementia without behavioral disturbance (H)  G30.0     F02.80       2. Weight loss  R63.4       3. Pale complexion  R23.1         Chronic, progressing dementia. " Increased confusion from baseline with refusal to eat. Weight loss of 12 lbs noted over month. Pale complexity and reporting feeling different. Low BPs.     Left message for daughter regarding assessment in ED given unable to obtain labs and urine.     Remeron 7.5 mg PO q HS for appetite stimulation.     MED REC REQUIRED  Post Medication Reconciliation Status: patient was not discharged from an inpatient facility or TCU      Orders:  1. BMP and CBC next Tuesday  2. Remeron 7.5 mg po HS  3. Sent to ED for evaluation if ok with daughter.     Electronically signed by:  Torri Khalil NP          Sincerely,        Torri Khalil NP

## 2023-07-19 NOTE — LETTER
"    7/19/2023        RE: Alberta Polanco  [address] [address]        [facility] GERIATRICS    Chief Complaint   Patient presents with     RECHECK     HPI:  Alberta Polanco is a 68 year old  (1955), who is being seen today for an episodic care visit at: [facility] ([facility]) [[id number]]. Today's concern is: Patient continues to demonstrate increased confusion, anorexia, and general decline. Was evaluated in ED on 7/13/23 and started on Keflex x7 days due to suspected UTI. No improvement with signs and symptoms. Unable to obtain repeat UA/UC in facility setting. No further indications of acute illness/infection. Staying in room and refusing meals.       Allergies, and PMH/PSH reviewed in [facility] today.  REVIEW OF SYSTEMS:  Unobtainable secondary to cognitive impairment.     Objective:   /73   Pulse 58   Temp 97  F (36.1  C)   Resp 16   Ht 1.397 m (4' 7\")   Wt 62.6 kg (138 lb)   SpO2 95%   BMI 32.07 kg/m    A & O x 1, NAD. Lungs CTA, non labored. RRR, S1/S2 w/o murmur,gallop or rub.  edema.  Abdomen soft, nontender, +BT'S. No focal neurological deficits.        Recent labs in [facility] reviewed by me today.     Assessment/Plan:  (N30.01) Acute cystitis with hematuria  (primary encounter diagnosis)  Comment: Started on Keflex on 7/13/23. No improvement seen at this time. No culture done in ED  Plan:   -Discontinue keflex  -Ciprofloxacin 500 mg PO BID x7 days    (G30.0,  F02.80) Early onset Alzheimer's dementia without behavioral disturbance (H)  Comment: Chronic, increased confusion 2/2 infection.   Plan:   -Memory care support with medication administration, meals and safety.     (R63.4) Weight loss  Comment:   Wt Readings from Last 4 Encounters:   07/19/23 62.6 kg (138 lb)   07/12/23 62.6 kg (138 lb)   03/01/23 72.1 kg (159 lb)   12/14/22 72.4 kg (159 lb 9.6 oz)   Plan:   -increase remeron 15 mg/HS        MED REC REQUIRED  Post Medication Reconciliation Status: discharge " medications reconciled and changed, per note/orders      Orders:  1. Discontinue keflex  2. Cipro 500 mg PO BID x 7 days   3. BMP in 1 week  4. Increase remeron 15 mg HS- anorexia     Electronically signed by:   Torri Khalil NP            Sincerely,        Torri Khalil, NP

## 2023-07-19 NOTE — PROGRESS NOTES
"[facility] GERIATRICS    Chief Complaint   Patient presents with     RECHECK     HPI:  Alberta Polanco is a 68 year old  (1955), who is being seen today for an episodic care visit at: [facility]) [[id number]]. Today's concern is: Patient continues to demonstrate increased confusion, anorexia, and general decline. Was evaluated in ED on 7/13/23 and started on Keflex x7 days due to suspected UTI. No improvement with signs and symptoms. Unable to obtain repeat UA/UC in facility setting. No further indications of acute illness/infection. Staying in room and refusing meals.       Allergies, and PMH/PSH reviewed in [facility] today.  REVIEW OF SYSTEMS:  Unobtainable secondary to cognitive impairment.     Objective:   /73   Pulse 58   Temp 97  F (36.1  C)   Resp 16   Ht 1.397 m (4' 7\")   Wt 62.6 kg (138 lb)   SpO2 95%   BMI 32.07 kg/m    A & O x 1, NAD. Lungs CTA, non labored. RRR, S1/S2 w/o murmur,gallop or rub.  edema.  Abdomen soft, nontender, +BT'S. No focal neurological deficits.        Recent labs in [facility] reviewed by me today.     Assessment/Plan:  (N30.01) Acute cystitis with hematuria  (primary encounter diagnosis)  Comment: Started on Keflex on 7/13/23. No improvement seen at this time. No culture done in ED  Plan:   -Discontinue keflex  -Ciprofloxacin 500 mg PO BID x7 days    (G30.0,  F02.80) Early onset Alzheimer's dementia without behavioral disturbance (H)  Comment: Chronic, increased confusion 2/2 infection.   Plan:   -Memory care support with medication administration, meals and safety.     (R63.4) Weight loss  Comment:   Wt Readings from Last 4 Encounters:   07/19/23 62.6 kg (138 lb)   07/12/23 62.6 kg (138 lb)   03/01/23 72.1 kg (159 lb)   12/14/22 72.4 kg (159 lb 9.6 oz)   Plan:   -increase remeron 15 mg/HS        MED REC REQUIRED  Post Medication Reconciliation Status: discharge medications reconciled and changed, per note/orders      Orders:  1. Discontinue keflex  2. " Cipro 500 mg PO BID x 7 days   3. BMP in 1 week  4. Increase remeron 15 mg HS- anorexia     Electronically signed by:   Torri Khalil NP

## 2023-07-31 NOTE — TELEPHONE ENCOUNTER
[facility] Triage Nurse Telephone Encounter    Provider: MICHELLE Mckeon CNP  Facility: [facility]  Facility Type:  AL    Caller: Facility RN  Call Back Number: [phone]    Allergies:    Allergies   Allergen Reactions    Latex Rash        Reason for call: Pt returned from the hospital today and reports having fallen during the stay on 7/27. Pt still continues to have mild low back pain post-fall and does not have any orders for Tylenol.     Verbal Order/Direction given by Provider:   [facility] STANDING ORDER GIVEN:  - Acetaminophen 650 mg PO q 4 hours PRN for pain/fever (acetaminophen not to exceed 4 grams per 24 hours)   - Apply ice/cold pack for 20 min QID PRN to injuries with inflammation     Provider giving Order:  MICHELLE Mckeon CNP    Verbal Order given to: Facility RN    Rashmi Rossi RN

## 2023-08-02 PROBLEM — A41.9 SEPSIS (H): Status: ACTIVE | Noted: 2023-01-01

## 2023-08-02 NOTE — PROGRESS NOTES
"[facility] GERIATRICS    PRIMARY CARE PROVIDER AND CLINIC:  Torri Khalil, NP, [address] [address]  Chief Complaint   Patient presents with    Hospital F/U      [facility] Medical Record Number:  [id number]  Place of Service where encounter took place:  [facility] ([facility]) [[id number]]    Alberta Polanco  is a 68 year old  (1955), admitted to the above facility from  [facility]. Hospital stay 7/25/23 through 8/2/23..   HPI:    Fall- open area right knee and residual back pain. (Started after, MD unable to workup in hospital 2/2 patient refusal). Using PRN acetaminophen.   WBC 22.1--> 7.2  Rocephin- UA/UC and blood cultures negative in hospital  MRI/CT head- neg     -Now won't eat.   -Family talking hospice, would like 1 week to see if she improves  -Depression, staying in dark room, reports \"my back hurts\"  -Unsteady on feet, assist of 1 with staff ambulating     Patient non-verbal with writer today. Was resting in general commons area.     BP Readings from Last 3 Encounters:   08/02/23 111/58   07/19/23 104/73   07/12/23 104/73     Wt Readings from Last 4 Encounters:   08/02/23 62.6 kg (138 lb)   07/19/23 62.6 kg (138 lb)   07/12/23 62.6 kg (138 lb)   03/01/23 72.1 kg (159 lb)     Pulse Readings from Last 4 Encounters:   08/02/23 98   07/19/23 58   07/12/23 58   03/01/23 60     CODE STATUS/ADVANCE DIRECTIVES DISCUSSION:  Prior  CPR/Full code   ALLERGIES:   Allergies   Allergen Reactions    Mirabegron      Decreased appetite, behavorial issues    Latex Rash      PAST MEDICAL HISTORY:   Past Medical History:   Diagnosis Date    Cerebral vascular disease 10/19/2021    Early onset Alzheimer's dementia without behavioral disturbance (H) 10/19/2021    History of ETOH abuse 10/19/2021    Memory deficits 9/18/2020    Formatting of this note might be different from the original. 9/20 MRI 1. Mild generalized cerebral volume loss. Disproportionate volume " loss and atrophy of the bilateral hippocampal formations, right greater than left. Atrophy of the anterior inferior temporal lobes. Findings are nonspecific but can be seen with neurodegenerative disorders and dementia.  2. No acute intracranial abnormality.  3.     Overactive bladder 10/19/2021    Uterine prolapse 10/19/2021      PAST SURGICAL HISTORY:   has no past surgical history on file.  FAMILY HISTORY: family history includes Heart Disease in her maternal grandmother and mother; Mental retardation in her sister.  SOCIAL HISTORY:   reports that she has never smoked. She has never used smokeless tobacco. She reports current alcohol use. She reports that she does not use drugs.  Patient's living condition: lives in an assisted living facility    Post Discharge Medication Reconciliation Status:   MED REC REQUIRED  Post Medication Reconciliation Status: discharge medications reconciled, continue medications without change       Current Outpatient Medications   Medication Sig    acetaminophen (TYLENOL) 325 MG tablet Take 650 mg by mouth every 4 hours as needed for mild pain    ASPIRIN LOW DOSE 81 MG EC tablet TAKE 1 TABLET BY MOUTH ONCE DAILY    busPIRone (BUSPAR) 15 MG tablet Take 0.5 tablets (7.5 mg) by mouth 2 times daily    CERTAVITE/ANTIOXIDANTS tablet TAKE 1 TABLET BY MOUTH ONCE DAILY    citalopram (CELEXA) 10 MG tablet TAKE 1 TABLET BY MOUTH ONCE DAILY    donepezil (ARICEPT) 10 MG tablet TAKE 1 TABLET BY MOUTH ONCE DAILY    hydrOXYzine (ATARAX) 25 MG tablet TAKE ONE-HALF TABLET (12.5 MG) BY MOUTH THREE TIMES DAILY    memantine (NAMENDA) 10 MG tablet TAKE 1 TABLET BY MOUTH TWICE DAILY    mirtazapine (REMERON) 15 MG tablet TAKE 1 TABLET BY MOUTH AT BEDTIME    QUEtiapine (SEROQUEL) 25 MG tablet Take 25 mg by mouth 2 times daily    tamsulosin (FLOMAX) 0.4 MG capsule Take 0.4 mg by mouth daily    VITAMIN D3 50 MCG (2000 UT) tablet TAKE 1 TABLET BY MOUTH ONCE DAILY    oxybutynin (DITROPAN) 5 MG tablet Take 0.5  "tablets (2.5 mg) by mouth 2 times daily (Patient not taking: Reported on 8/2/2023)    traZODone (DESYREL) 50 MG tablet Take 0.5 tablets (25 mg) by mouth 2 times daily as needed for other (anxiety) (Patient not taking: Reported on 8/2/2023)     No current facility-administered medications for this visit.       ROS:  10 point ROS of systems including Constitutional, Eyes, Respiratory, Cardiovascular, Gastroenterology, Genitourinary, Integumentary, Musculoskeletal, Psychiatric were all negative except for pertinent positives noted in my HPI.    Vitals:  /58   Pulse 98   Temp 98  F (36.7  C)   Resp 26   Ht 1.397 m (4' 7\")   Wt 62.6 kg (138 lb)   SpO2 95%   BMI 32.07 kg/m    Exam:  GENERAL APPEARANCE:  Alert, thin, appears ill  ENT:  Mouth and posterior oropharynx normal, moist mucous membranes, normal hearing acuity  EYES:  EOM, conjunctivae, lids, pupils and irises normal  RESP:  respiratory effort and palpation of chest normal, lungs clear to auscultation , no respiratory distress  CV:  Palpation and auscultation of heart done , regular rate and rhythm, no murmur, rub, or gallop, no edema  ABDOMEN:  normal bowel sounds, soft, nontender, no hepatosplenomegaly or other masses, no guarding or rebound  M/S:   Gait and station normal  Digits and nails normal  SKIN:  Inspection of skin and subcutaneous tissue baseline, Palpation of skin and subcutaneous tissue baseline  NEURO:   Cranial nerves 2-12 are normal tested and grossly at patient's baseline  PSYCH:  insight and judgement impaired, memory impaired , affect and mood normal    Lab/Diagnostic data:  Recent labs in [facility] reviewed by me today.     ASSESSMENT/PLAN:    (G30.0,  F02.80) Early onset Alzheimer's dementia without behavioral disturbance (H)  (primary encounter diagnosis)  (F32.0) Current mild episode of major depressive disorder without prior episode (H)  Comment:   Further deconditioning since hospitalization.   -Behaviors ok on current regimen. " Possibly able to reduce bill burden if continues to decline.   - support with medication administration, meals and safety    (R62.7) Failure to thrive in adult  (R63.4) Weight loss  Comment:   -Minimum oral intake.   -Family asking to wait 1 week until consider hospice treatment. Will follow up.     Orders:  NNO  Total time spent with patient visit at the skilled nursing facility was 45 min including patient visit and review of past records.     Electronically signed by:  Torri Khalil NP

## 2023-08-02 NOTE — LETTER
"    8/2/2023        RE: Alberta Polanco  [address]  [place] [address]        [facility] GERIATRICS    PRIMARY CARE PROVIDER AND CLINIC:  Torri Khalil NP, [address] [facility] [address]  Chief Complaint   Patient presents with     Hospital F/U      [facility] Medical Record Number:  [id number]  Place of Service where encounter took place:  [facility] ([facility]) [[id number]]    Alberta Polanco  is a 68 year old  (1955), admitted to the above facility from  [facility]. Hospital stay 7/25/23 through 8/2/23..   HPI:    Fall- open area right knee and residual back pain. (Started after, MD unable to workup in hospital 2/2 patient refusal). Using PRN acetaminophen.   WBC 22.1--> 7.2  Rocephin- UA/UC and blood cultures negative in hospital  MRI/CT head- neg     -Now won't eat.   -Family talking hospice, would like 1 week to see if she improves  -Depression, staying in dark room, reports \"my back hurts\"  -Unsteady on feet, assist of 1 with staff ambulating     Patient non-verbal with writer today. Was resting in general commons area.     BP Readings from Last 3 Encounters:   08/02/23 111/58   07/19/23 104/73   07/12/23 104/73     Wt Readings from Last 4 Encounters:   08/02/23 62.6 kg (138 lb)   07/19/23 62.6 kg (138 lb)   07/12/23 62.6 kg (138 lb)   03/01/23 72.1 kg (159 lb)     Pulse Readings from Last 4 Encounters:   08/02/23 98   07/19/23 58   07/12/23 58   03/01/23 60     CODE STATUS/ADVANCE DIRECTIVES DISCUSSION:  Prior  CPR/Full code   ALLERGIES:   Allergies   Allergen Reactions     Mirabegron      Decreased appetite, behavorial issues     Latex Rash      PAST MEDICAL HISTORY:   Past Medical History:   Diagnosis Date     Cerebral vascular disease 10/19/2021     Early onset Alzheimer's dementia without behavioral disturbance (H) 10/19/2021     History of ETOH abuse 10/19/2021     Memory deficits 9/18/2020    Formatting of this note might be different " from the original. 9/20 MRI 1. Mild generalized cerebral volume loss. Disproportionate volume loss and atrophy of the bilateral hippocampal formations, right greater than left. Atrophy of the anterior inferior temporal lobes. Findings are nonspecific but can be seen with neurodegenerative disorders and dementia.  2. No acute intracranial abnormality.  3.      Overactive bladder 10/19/2021     Uterine prolapse 10/19/2021      PAST SURGICAL HISTORY:   has no past surgical history on file.  FAMILY HISTORY: family history includes Heart Disease in her maternal grandmother and mother; Mental retardation in her sister.  SOCIAL HISTORY:   reports that she has never smoked. She has never used smokeless tobacco. She reports current alcohol use. She reports that she does not use drugs.  Patient's living condition: lives in an assisted living facility    Post Discharge Medication Reconciliation Status:   MED REC REQUIRED  Post Medication Reconciliation Status: discharge medications reconciled, continue medications without change       Current Outpatient Medications   Medication Sig     acetaminophen (TYLENOL) 325 MG tablet Take 650 mg by mouth every 4 hours as needed for mild pain     ASPIRIN LOW DOSE 81 MG EC tablet TAKE 1 TABLET BY MOUTH ONCE DAILY     busPIRone (BUSPAR) 15 MG tablet Take 0.5 tablets (7.5 mg) by mouth 2 times daily     CERTAVITE/ANTIOXIDANTS tablet TAKE 1 TABLET BY MOUTH ONCE DAILY     citalopram (CELEXA) 10 MG tablet TAKE 1 TABLET BY MOUTH ONCE DAILY     donepezil (ARICEPT) 10 MG tablet TAKE 1 TABLET BY MOUTH ONCE DAILY     hydrOXYzine (ATARAX) 25 MG tablet TAKE ONE-HALF TABLET (12.5 MG) BY MOUTH THREE TIMES DAILY     memantine (NAMENDA) 10 MG tablet TAKE 1 TABLET BY MOUTH TWICE DAILY     mirtazapine (REMERON) 15 MG tablet TAKE 1 TABLET BY MOUTH AT BEDTIME     QUEtiapine (SEROQUEL) 25 MG tablet Take 25 mg by mouth 2 times daily     tamsulosin (FLOMAX) 0.4 MG capsule Take 0.4 mg by mouth daily     VITAMIN D3  "50 MCG (2000 UT) tablet TAKE 1 TABLET BY MOUTH ONCE DAILY     oxybutynin (DITROPAN) 5 MG tablet Take 0.5 tablets (2.5 mg) by mouth 2 times daily (Patient not taking: Reported on 8/2/2023)     traZODone (DESYREL) 50 MG tablet Take 0.5 tablets (25 mg) by mouth 2 times daily as needed for other (anxiety) (Patient not taking: Reported on 8/2/2023)     No current facility-administered medications for this visit.       ROS:  10 point ROS of systems including Constitutional, Eyes, Respiratory, Cardiovascular, Gastroenterology, Genitourinary, Integumentary, Musculoskeletal, Psychiatric were all negative except for pertinent positives noted in my HPI.    Vitals:  /58   Pulse 98   Temp 98  F (36.7  C)   Resp 26   Ht 1.397 m (4' 7\")   Wt 62.6 kg (138 lb)   SpO2 95%   BMI 32.07 kg/m    Exam:  GENERAL APPEARANCE:  Alert, thin, appears ill  ENT:  Mouth and posterior oropharynx normal, moist mucous membranes, normal hearing acuity  EYES:  EOM, conjunctivae, lids, pupils and irises normal  RESP:  respiratory effort and palpation of chest normal, lungs clear to auscultation , no respiratory distress  CV:  Palpation and auscultation of heart done , regular rate and rhythm, no murmur, rub, or gallop, no edema  ABDOMEN:  normal bowel sounds, soft, nontender, no hepatosplenomegaly or other masses, no guarding or rebound  M/S:   Gait and station normal  Digits and nails normal  SKIN:  Inspection of skin and subcutaneous tissue baseline, Palpation of skin and subcutaneous tissue baseline  NEURO:   Cranial nerves 2-12 are normal tested and grossly at patient's baseline  PSYCH:  insight and judgement impaired, memory impaired , affect and mood normal    Lab/Diagnostic data:  Recent labs in [facility] reviewed by me today.     ASSESSMENT/PLAN:    (G30.0,  F02.80) Early onset Alzheimer's dementia without behavioral disturbance (H)  (primary encounter diagnosis)  (F32.0) Current mild episode of major depressive disorder without prior " episode (H)  Comment:   Further deconditioning since hospitalization.   -Behaviors ok on current regimen. Possibly able to reduce bill burden if continues to decline.   - support with medication administration, meals and safety    (R62.7) Failure to thrive in adult  (R63.4) Weight loss  Comment:   -Minimum oral intake.   -Family asking to wait 1 week until consider hospice treatment. Will follow up.     Orders:  NNO  Total time spent with patient visit at the skilled nursing facility was 45 min including patient visit and review of past records.     Electronically signed by:  Torri Khalil NP                     Sincerely,        Torri Khalil NP

## 2023-10-12 NOTE — TELEPHONE ENCOUNTER
Medication/Dose: BUSPIRONE 7.5MG. TAKE 1 TAB TWICE DAILY    Patient is on [place] hospice. This medication is not hospice covered but patient's Medicare Part D thinks it could be covered by hospice so a prior auth is required.     Here is the rejection we're getting:        Insurance Name: [employer]  Insurance ID:   GROUP:   BIN: [id number]  PCN: [id number]  Insurance Phone Number: 1    Anirudh Edenbilt  Pharmacy Technician  [facility]  [address], [place] [address]  Phone: [phone]  Fax: [phone]

## 2023-10-16 NOTE — TELEPHONE ENCOUNTER
Central Prior Authorization Team  Phone: [phone]    PA Initiation    Medication: BUSPIRONE HCL 7.5 MG PO TABS  Insurance Company: [employer] Part D - Phone [phone] Fax [phone]  Pharmacy Filling the Rx:    Filling Pharmacy Phone:    Filling Pharmacy Fax:    Start Date: 10/16/2023

## 2023-10-17 NOTE — TELEPHONE ENCOUNTER
Central Prior Authorization Team  Phone: [phone]    Prior Authorization Approval    Medication: BUSPIRONE HCL 7.5 MG PO TABS  Authorization Effective Date: 8/1/2023  Authorization Expiration Date: 12/31/2024  Approved Dose/Quantity:   Reference #:     Insurance Company: [employer] Part D - Phone [phone] Fax [phone]  Expected CoPay: $    CoPay Card Available:      Financial Assistance Needed:   Which Pharmacy is filling the prescription:    Pharmacy Notified:   Patient Notified:

## 2024-01-01 ENCOUNTER — ASSISTED LIVING VISIT (OUTPATIENT)
Dept: GERIATRICS | Facility: CLINIC | Age: 69
End: 2024-01-01
Payer: OTHER MISCELLANEOUS

## 2024-01-01 VITALS
TEMPERATURE: 98.1 F | WEIGHT: 136.2 LBS | RESPIRATION RATE: 16 BRPM | DIASTOLIC BLOOD PRESSURE: 88 MMHG | HEIGHT: 55 IN | SYSTOLIC BLOOD PRESSURE: 137 MMHG | BODY MASS INDEX: 31.52 KG/M2 | HEART RATE: 73 BPM | OXYGEN SATURATION: 93 %

## 2024-01-01 DIAGNOSIS — R41.3 MEMORY DEFICITS: Primary | ICD-10-CM

## 2024-01-01 PROCEDURE — 99348 HOME/RES VST EST LOW MDM 30: CPT | Performed by: NURSE PRACTITIONER

## 2024-01-04 NOTE — PROGRESS NOTES
"[facility] GERIATRICS    Chief Complaint   Patient presents with    RECHECK     ED follow-up Head Abrasion     HPI:  Alberta Polanco is a 68 year old  (1955), who is being seen today for an episodic care visit at: [facility]) [[id number]]. Today's concern is:   -Patient with recent fall and head abrasion. Resides in  unit 2/2 baseline cognitive impairment. Has been having increased decline, now enrolled with hospice care with goals of care focused on comfort and pain control.   -No indications of pain or infection.   -HPI difficult to obtain 2/2 baseline impairment.     BP Readings from Last 3 Encounters:   01/05/24 137/88   08/02/23 111/58   07/19/23 104/73     Pulse Readings from Last 4 Encounters:   01/05/24 73   08/02/23 98   07/19/23 58   07/12/23 58     Wt Readings from Last 4 Encounters:   01/05/24 61.8 kg (136 lb 3.2 oz)   08/02/23 62.6 kg (138 lb)   07/19/23 62.6 kg (138 lb)   07/12/23 62.6 kg (138 lb)         Allergies, and PMH/PSH reviewed in [facility] today.  REVIEW OF SYSTEMS:  Unobtainable secondary to cognitive impairment.     Objective:   /88   Pulse 73   Temp 98.1  F (36.7  C)   Resp 16   Ht 1.397 m (4' 7\")   Wt 61.8 kg (136 lb 3.2 oz)   SpO2 93%   BMI 31.66 kg/m    A & O x 1, NAD. Lungs CTA, non labored. RRR, S1/S2 w/o murmur,gallop or rub.  edema.  Abdomen soft, nontender, +BT'S. No focal neurological deficits.        Recent labs in [facility] reviewed by me today.     Assessment/Plan:  Memory deficits  Resides on secure dementia unit with assistance for ADLs and meals.   -hospice care with goals of care focused on comfort and pain control   -no new orders today, appreciate hospice recommendations and care.     MED REC REQUIRED  Post Medication Reconciliation Status: patient was not discharged from an inpatient facility or TCU      Orders:  See above, otherwise the current medical regimen is effective;  continue present plan and medications.      Electronically signed " by: Torri Khalil, NP

## 2024-01-05 NOTE — LETTER
"    1/5/2024        RE: Albetra Polanco  [address] [address]        [facility] GERIATRICS    Chief Complaint   Patient presents with     RECHECK     ED follow-up Head Abrasion     HPI:  Alberta Polanco is a 68 year old  (1955), who is being seen today for an episodic care visit at: [facility] [place]) [[id number]]. Today's concern is:   -Patient with recent fall and head abrasion. Resides in  unit 2/2 baseline cognitive impairment. Has been having increased decline, now enrolled with hospice care with goals of care focused on comfort and pain control.   -No indications of pain or infection.   -HPI difficult to obtain 2/2 baseline impairment.     BP Readings from Last 3 Encounters:   01/05/24 137/88   08/02/23 111/58   07/19/23 104/73     Pulse Readings from Last 4 Encounters:   01/05/24 73   08/02/23 98   07/19/23 58   07/12/23 58     Wt Readings from Last 4 Encounters:   01/05/24 61.8 kg (136 lb 3.2 oz)   08/02/23 62.6 kg (138 lb)   07/19/23 62.6 kg (138 lb)   07/12/23 62.6 kg (138 lb)         Allergies, and PMH/PSH reviewed in [employer] today.  REVIEW OF SYSTEMS:  Unobtainable secondary to cognitive impairment.     Objective:   /88   Pulse 73   Temp 98.1  F (36.7  C)   Resp 16   Ht 1.397 m (4' 7\")   Wt 61.8 kg (136 lb 3.2 oz)   SpO2 93%   BMI 31.66 kg/m    A & O x 1, NAD. Lungs CTA, non labored. RRR, S1/S2 w/o murmur,gallop or rub.  edema.  Abdomen soft, nontender, +BT'S. No focal neurological deficits.        Recent labs in [employer] reviewed by me today.     Assessment/Plan:  Memory deficits  Resides on secure dementia unit with assistance for ADLs and meals.   -hospice care with goals of care focused on comfort and pain control   -no new orders today, appreciate hospice recommendations and care.     MED REC REQUIRED  Post Medication Reconciliation Status: patient was not discharged from an inpatient facility or TCU      Orders:  See above, otherwise the current medical " regimen is effective;  continue present plan and medications.      Electronically signed by: Torri Khalil NP           Sincerely,        Torri Khalil NP